# Patient Record
Sex: MALE | Race: WHITE | NOT HISPANIC OR LATINO | Employment: FULL TIME | URBAN - METROPOLITAN AREA
[De-identification: names, ages, dates, MRNs, and addresses within clinical notes are randomized per-mention and may not be internally consistent; named-entity substitution may affect disease eponyms.]

---

## 2021-06-16 PROBLEM — F43.25 ADJUSTMENT DISORDER WITH MIXED DISTURBANCE OF EMOTIONS AND CONDUCT: Status: ACTIVE | Noted: 2019-08-26

## 2021-06-16 PROBLEM — F39 UNSPECIFIED MOOD (AFFECTIVE) DISORDER (H): Status: ACTIVE | Noted: 2019-08-24

## 2021-06-16 PROBLEM — R45.851 SUICIDAL IDEATION: Status: ACTIVE | Noted: 2019-08-24

## 2024-09-30 ENCOUNTER — APPOINTMENT (OUTPATIENT)
Dept: MRI IMAGING | Facility: CLINIC | Age: 64
DRG: 617 | End: 2024-09-30
Attending: PODIATRIST

## 2024-09-30 ENCOUNTER — APPOINTMENT (OUTPATIENT)
Dept: GENERAL RADIOLOGY | Facility: CLINIC | Age: 64
DRG: 617 | End: 2024-09-30
Attending: EMERGENCY MEDICINE

## 2024-09-30 ENCOUNTER — HOSPITAL ENCOUNTER (INPATIENT)
Facility: CLINIC | Age: 64
LOS: 4 days | Discharge: HOME | DRG: 617 | End: 2024-10-04
Attending: EMERGENCY MEDICINE | Admitting: STUDENT IN AN ORGANIZED HEALTH CARE EDUCATION/TRAINING PROGRAM

## 2024-09-30 DIAGNOSIS — L03.032 CELLULITIS OF TOE OF LEFT FOOT: ICD-10-CM

## 2024-09-30 DIAGNOSIS — E11.40 TYPE 2 DIABETES MELLITUS WITH DIABETIC NEUROPATHY, WITH LONG-TERM CURRENT USE OF INSULIN (H): ICD-10-CM

## 2024-09-30 DIAGNOSIS — I10 BENIGN ESSENTIAL HYPERTENSION: ICD-10-CM

## 2024-09-30 DIAGNOSIS — Z79.4 TYPE 2 DIABETES MELLITUS WITH DIABETIC NEUROPATHY, WITH LONG-TERM CURRENT USE OF INSULIN (H): ICD-10-CM

## 2024-09-30 DIAGNOSIS — E10.65 POOR CONTROL TYPE I DIABETES MELLITUS (H): ICD-10-CM

## 2024-09-30 DIAGNOSIS — E11.65 TYPE 2 DIABETES MELLITUS WITH HYPERGLYCEMIA, WITH LONG-TERM CURRENT USE OF INSULIN (H): Primary | ICD-10-CM

## 2024-09-30 DIAGNOSIS — M86.9 OSTEOMYELITIS OF LEFT FOOT, UNSPECIFIED TYPE (H): ICD-10-CM

## 2024-09-30 DIAGNOSIS — Z79.4 TYPE 2 DIABETES MELLITUS WITH HYPERGLYCEMIA, WITH LONG-TERM CURRENT USE OF INSULIN (H): Primary | ICD-10-CM

## 2024-09-30 LAB
ANION GAP SERPL CALCULATED.3IONS-SCNC: 10 MMOL/L (ref 7–15)
BASOPHILS # BLD AUTO: 0.1 10E3/UL (ref 0–0.2)
BASOPHILS NFR BLD AUTO: 1 %
BUN SERPL-MCNC: 14.8 MG/DL (ref 8–23)
CALCIUM SERPL-MCNC: 9.1 MG/DL (ref 8.8–10.4)
CHLORIDE SERPL-SCNC: 101 MMOL/L (ref 98–107)
CREAT SERPL-MCNC: 0.5 MG/DL (ref 0.67–1.17)
CRP SERPL-MCNC: 6.78 MG/L
CRP SERPL-MCNC: 7.51 MG/L
EGFRCR SERPLBLD CKD-EPI 2021: >90 ML/MIN/1.73M2
EOSINOPHIL # BLD AUTO: 0.3 10E3/UL (ref 0–0.7)
EOSINOPHIL NFR BLD AUTO: 3 %
ERYTHROCYTE [DISTWIDTH] IN BLOOD BY AUTOMATED COUNT: 11.3 % (ref 10–15)
ERYTHROCYTE [SEDIMENTATION RATE] IN BLOOD BY WESTERGREN METHOD: 26 MM/HR (ref 0–20)
EST. AVERAGE GLUCOSE BLD GHB EST-MCNC: 338 MG/DL
GLUCOSE BLDC GLUCOMTR-MCNC: 266 MG/DL (ref 70–99)
GLUCOSE BLDC GLUCOMTR-MCNC: 300 MG/DL (ref 70–99)
GLUCOSE BLDC GLUCOMTR-MCNC: 311 MG/DL (ref 70–99)
GLUCOSE SERPL-MCNC: 300 MG/DL (ref 70–99)
HBA1C MFR BLD: 13.4 %
HCO3 SERPL-SCNC: 26 MMOL/L (ref 22–29)
HCT VFR BLD AUTO: 43 % (ref 40–53)
HGB BLD-MCNC: 14.7 G/DL (ref 13.3–17.7)
IMM GRANULOCYTES # BLD: 0 10E3/UL
IMM GRANULOCYTES NFR BLD: 0 %
LYMPHOCYTES # BLD AUTO: 2.1 10E3/UL (ref 0.8–5.3)
LYMPHOCYTES NFR BLD AUTO: 21 %
MCH RBC QN AUTO: 29.8 PG (ref 26.5–33)
MCHC RBC AUTO-ENTMCNC: 34.2 G/DL (ref 31.5–36.5)
MCV RBC AUTO: 87 FL (ref 78–100)
MONOCYTES # BLD AUTO: 0.7 10E3/UL (ref 0–1.3)
MONOCYTES NFR BLD AUTO: 7 %
MRSA DNA SPEC QL NAA+PROBE: NEGATIVE
NEUTROPHILS # BLD AUTO: 6.9 10E3/UL (ref 1.6–8.3)
NEUTROPHILS NFR BLD AUTO: 68 %
NRBC # BLD AUTO: 0 10E3/UL
NRBC BLD AUTO-RTO: 0 /100
PLATELET # BLD AUTO: 361 10E3/UL (ref 150–450)
POTASSIUM SERPL-SCNC: 3.9 MMOL/L (ref 3.4–5.3)
RBC # BLD AUTO: 4.94 10E6/UL (ref 4.4–5.9)
SA TARGET DNA: POSITIVE
SODIUM SERPL-SCNC: 137 MMOL/L (ref 135–145)
WBC # BLD AUTO: 10.1 10E3/UL (ref 4–11)

## 2024-09-30 PROCEDURE — 36415 COLL VENOUS BLD VENIPUNCTURE: CPT | Performed by: PHYSICIAN ASSISTANT

## 2024-09-30 PROCEDURE — 250N000011 HC RX IP 250 OP 636: Performed by: PHYSICIAN ASSISTANT

## 2024-09-30 PROCEDURE — 99285 EMERGENCY DEPT VISIT HI MDM: CPT | Mod: 25

## 2024-09-30 PROCEDURE — 99254 IP/OBS CNSLTJ NEW/EST MOD 60: CPT | Performed by: PODIATRIST

## 2024-09-30 PROCEDURE — 86140 C-REACTIVE PROTEIN: CPT | Performed by: PHYSICIAN ASSISTANT

## 2024-09-30 PROCEDURE — 258N000003 HC RX IP 258 OP 636: Performed by: PHYSICIAN ASSISTANT

## 2024-09-30 PROCEDURE — 83036 HEMOGLOBIN GLYCOSYLATED A1C: CPT | Performed by: EMERGENCY MEDICINE

## 2024-09-30 PROCEDURE — 85652 RBC SED RATE AUTOMATED: CPT | Performed by: PHYSICIAN ASSISTANT

## 2024-09-30 PROCEDURE — 99223 1ST HOSP IP/OBS HIGH 75: CPT | Performed by: PHYSICIAN ASSISTANT

## 2024-09-30 PROCEDURE — 36415 COLL VENOUS BLD VENIPUNCTURE: CPT | Performed by: EMERGENCY MEDICINE

## 2024-09-30 PROCEDURE — 87641 MR-STAPH DNA AMP PROBE: CPT | Performed by: PHYSICIAN ASSISTANT

## 2024-09-30 PROCEDURE — 73718 MRI LOWER EXTREMITY W/O DYE: CPT | Mod: LT

## 2024-09-30 PROCEDURE — 250N000013 HC RX MED GY IP 250 OP 250 PS 637: Performed by: PHYSICIAN ASSISTANT

## 2024-09-30 PROCEDURE — 85025 COMPLETE CBC W/AUTO DIFF WBC: CPT | Performed by: EMERGENCY MEDICINE

## 2024-09-30 PROCEDURE — 86140 C-REACTIVE PROTEIN: CPT | Performed by: EMERGENCY MEDICINE

## 2024-09-30 PROCEDURE — 120N000001 HC R&B MED SURG/OB

## 2024-09-30 PROCEDURE — 80048 BASIC METABOLIC PNL TOTAL CA: CPT | Performed by: EMERGENCY MEDICINE

## 2024-09-30 PROCEDURE — 73660 X-RAY EXAM OF TOE(S): CPT | Mod: LT

## 2024-09-30 PROCEDURE — 87040 BLOOD CULTURE FOR BACTERIA: CPT | Performed by: PHYSICIAN ASSISTANT

## 2024-09-30 PROCEDURE — 250N000012 HC RX MED GY IP 250 OP 636 PS 637: Performed by: PHYSICIAN ASSISTANT

## 2024-09-30 RX ORDER — ACETAMINOPHEN 325 MG/1
650 TABLET ORAL EVERY 4 HOURS PRN
Status: DISCONTINUED | OUTPATIENT
Start: 2024-09-30 | End: 2024-10-04 | Stop reason: HOSPADM

## 2024-09-30 RX ORDER — CALCIUM CARBONATE 500 MG/1
1000 TABLET, CHEWABLE ORAL 4 TIMES DAILY PRN
Status: DISCONTINUED | OUTPATIENT
Start: 2024-09-30 | End: 2024-10-04 | Stop reason: HOSPADM

## 2024-09-30 RX ORDER — VANCOMYCIN HYDROCHLORIDE
1500 EVERY 12 HOURS
Status: DISCONTINUED | OUTPATIENT
Start: 2024-10-01 | End: 2024-09-30

## 2024-09-30 RX ORDER — PIPERACILLIN SODIUM, TAZOBACTAM SODIUM 3; .375 G/15ML; G/15ML
3.38 INJECTION, POWDER, LYOPHILIZED, FOR SOLUTION INTRAVENOUS EVERY 6 HOURS
Status: DISCONTINUED | OUTPATIENT
Start: 2024-09-30 | End: 2024-10-04

## 2024-09-30 RX ORDER — DEXTROSE MONOHYDRATE 25 G/50ML
25-50 INJECTION, SOLUTION INTRAVENOUS
Status: DISCONTINUED | OUTPATIENT
Start: 2024-09-30 | End: 2024-10-04 | Stop reason: HOSPADM

## 2024-09-30 RX ORDER — NICOTINE POLACRILEX 4 MG
15-30 LOZENGE BUCCAL
Status: DISCONTINUED | OUTPATIENT
Start: 2024-09-30 | End: 2024-10-04 | Stop reason: HOSPADM

## 2024-09-30 RX ORDER — AMOXICILLIN 250 MG
2 CAPSULE ORAL 2 TIMES DAILY PRN
Status: DISCONTINUED | OUTPATIENT
Start: 2024-09-30 | End: 2024-10-04 | Stop reason: HOSPADM

## 2024-09-30 RX ORDER — AMOXICILLIN 250 MG
1 CAPSULE ORAL 2 TIMES DAILY PRN
Status: DISCONTINUED | OUTPATIENT
Start: 2024-09-30 | End: 2024-10-04 | Stop reason: HOSPADM

## 2024-09-30 RX ORDER — PROCHLORPERAZINE 25 MG
25 SUPPOSITORY, RECTAL RECTAL EVERY 12 HOURS PRN
Status: DISCONTINUED | OUTPATIENT
Start: 2024-09-30 | End: 2024-10-04 | Stop reason: HOSPADM

## 2024-09-30 RX ORDER — LIDOCAINE 40 MG/G
CREAM TOPICAL
Status: DISCONTINUED | OUTPATIENT
Start: 2024-09-30 | End: 2024-10-04 | Stop reason: HOSPADM

## 2024-09-30 RX ORDER — ONDANSETRON 4 MG/1
4 TABLET, ORALLY DISINTEGRATING ORAL EVERY 6 HOURS PRN
Status: DISCONTINUED | OUTPATIENT
Start: 2024-09-30 | End: 2024-10-04 | Stop reason: HOSPADM

## 2024-09-30 RX ORDER — ONDANSETRON 2 MG/ML
4 INJECTION INTRAMUSCULAR; INTRAVENOUS EVERY 6 HOURS PRN
Status: DISCONTINUED | OUTPATIENT
Start: 2024-09-30 | End: 2024-10-04 | Stop reason: HOSPADM

## 2024-09-30 RX ORDER — POLYETHYLENE GLYCOL 3350 17 G/17G
17 POWDER, FOR SOLUTION ORAL 2 TIMES DAILY PRN
Status: DISCONTINUED | OUTPATIENT
Start: 2024-09-30 | End: 2024-10-04 | Stop reason: HOSPADM

## 2024-09-30 RX ORDER — VANCOMYCIN 1.75 GRAM/500 ML IN 0.9 % SODIUM CHLORIDE INTRAVENOUS
1750 ONCE
Status: COMPLETED | OUTPATIENT
Start: 2024-09-30 | End: 2024-09-30

## 2024-09-30 RX ORDER — ACETAMINOPHEN 650 MG/1
650 SUPPOSITORY RECTAL EVERY 4 HOURS PRN
Status: DISCONTINUED | OUTPATIENT
Start: 2024-09-30 | End: 2024-10-04 | Stop reason: HOSPADM

## 2024-09-30 RX ORDER — PROCHLORPERAZINE MALEATE 10 MG
10 TABLET ORAL EVERY 6 HOURS PRN
Status: DISCONTINUED | OUTPATIENT
Start: 2024-09-30 | End: 2024-10-04 | Stop reason: HOSPADM

## 2024-09-30 RX ORDER — GABAPENTIN 100 MG/1
100 CAPSULE ORAL 3 TIMES DAILY
Status: DISCONTINUED | OUTPATIENT
Start: 2024-09-30 | End: 2024-10-04 | Stop reason: HOSPADM

## 2024-09-30 RX ORDER — CEFAZOLIN SODIUM 2 G/100ML
2 INJECTION, SOLUTION INTRAVENOUS ONCE
Status: DISCONTINUED | OUTPATIENT
Start: 2024-09-30 | End: 2024-09-30

## 2024-09-30 RX ORDER — BISACODYL 10 MG
10 SUPPOSITORY, RECTAL RECTAL DAILY PRN
Status: DISCONTINUED | OUTPATIENT
Start: 2024-09-30 | End: 2024-10-04 | Stop reason: HOSPADM

## 2024-09-30 RX ADMIN — PIPERACILLIN AND TAZOBACTAM 3.38 G: 3; .375 INJECTION, POWDER, FOR SOLUTION INTRAVENOUS at 23:49

## 2024-09-30 RX ADMIN — PIPERACILLIN AND TAZOBACTAM 3.38 G: 3; .375 INJECTION, POWDER, FOR SOLUTION INTRAVENOUS at 17:46

## 2024-09-30 RX ADMIN — VANCOMYCIN HYDROCHLORIDE 1750 MG: 10 INJECTION, POWDER, LYOPHILIZED, FOR SOLUTION INTRAVENOUS at 15:19

## 2024-09-30 RX ADMIN — GABAPENTIN 100 MG: 100 CAPSULE ORAL at 17:46

## 2024-09-30 RX ADMIN — INSULIN ASPART 3 UNITS: 100 INJECTION, SOLUTION INTRAVENOUS; SUBCUTANEOUS at 18:37

## 2024-09-30 RX ADMIN — INSULIN GLARGINE 10 UNITS: 100 INJECTION, SOLUTION SUBCUTANEOUS at 22:03

## 2024-09-30 RX ADMIN — GABAPENTIN 100 MG: 100 CAPSULE ORAL at 22:02

## 2024-09-30 RX ADMIN — PIPERACILLIN AND TAZOBACTAM 3.38 G: 3; .375 INJECTION, POWDER, FOR SOLUTION INTRAVENOUS at 12:13

## 2024-09-30 RX ADMIN — INSULIN ASPART 3 UNITS: 100 INJECTION, SOLUTION INTRAVENOUS; SUBCUTANEOUS at 22:03

## 2024-09-30 ASSESSMENT — ACTIVITIES OF DAILY LIVING (ADL)
ADLS_ACUITY_SCORE: 18
ADLS_ACUITY_SCORE: 35
ADLS_ACUITY_SCORE: 18
ADLS_ACUITY_SCORE: 35
ADLS_ACUITY_SCORE: 35
ADLS_ACUITY_SCORE: 18
DEPENDENT_IADLS:: INDEPENDENT
ADLS_ACUITY_SCORE: 35
ADLS_ACUITY_SCORE: 35
ADLS_ACUITY_SCORE: 18
ADLS_ACUITY_SCORE: 18

## 2024-09-30 ASSESSMENT — COLUMBIA-SUICIDE SEVERITY RATING SCALE - C-SSRS
2. HAVE YOU ACTUALLY HAD ANY THOUGHTS OF KILLING YOURSELF IN THE PAST MONTH?: NO
1. IN THE PAST MONTH, HAVE YOU WISHED YOU WERE DEAD OR WISHED YOU COULD GO TO SLEEP AND NOT WAKE UP?: NO
6. HAVE YOU EVER DONE ANYTHING, STARTED TO DO ANYTHING, OR PREPARED TO DO ANYTHING TO END YOUR LIFE?: NO

## 2024-09-30 NOTE — H&P
[facility]    History and Physical - Hospitalist Service       Date of Admission:  9/30/2024    Assessment & Plan   Artem Hernandez is a 64 year old male with PMHx of IDDM admitted on 9/30/2024. He presented for further evaluation of L second digit swelling and erythema, found to have osteomyelitis. Admitted for IV antibiotics and podiatry consult.     Osteomyelitis, L second toe: Clipped nail within the past week and subsequently noticed worsening erythema and swelling. No fevers.   *WBC WNL, received Zosyn in the ED   - Admit to inpatient   - Podiatry consult   - Continue PTA Zosyn, add Vancomycin  - PRN tylenol and gabapentin for pain   - MRSA swab   - Mod CHO 9/30, NPO at midnight     T2DM, insulin dependent, uncontrolled, with associated peripheral neuropathy:  Hemoglobin A1C   Date Value Ref Range Status   09/30/2024 13.4 (H) <5.7 % Final     Comment:     Normal <5.7%   Prediabetes 5.7-6.4%    Diabetes 6.5% or higher     Note: Adopted from ADA consensus guidelines.   [NPH and Novolin R BID]  - Hold PTA insulin, took AM dose   - Start Lantus 10 U at bedtime   - Medium sliding scale insulin ordered  - Novolog 1 U per 15 g CHO TID AC  - Gabapentin 100 mg TID initiated for neuropathic pain, uptitrate   - Pharmacy liaison consult for insulin coverage given lack of insurance    - BS per protocol   - Monitor for hypoglycemia    Recent Labs   Lab 09/30/24  0842   *     Hypertension: Previously on Lisinopril, but off for a while. BP elevated on admission. Monitor and add antihypertensives if persists.         Diet:  Mod CHO   DVT Prophylaxis: Pneumatic Compression Devices  Barnes Catheter: Not present  Lines: None     Cardiac Monitoring: None  Code Status:  Full Code     Clinically Significant Risk Factors Present on Admission                  # Hypertension: Home medication list includes antihypertensive(s)        # DMII: A1C = 13.4 % (Ref range: <5.7 %) within past 6 months                Disposition Plan     Medically Ready for Discharge: Anticipated in 2-4 Days         The patient's care was discussed with the Attending Physician, Dr. Morton, Bedside Nurse, Patient, and Patient's Family.    Carmen Villaseñor PA-C  Hospitalist Service  [facility]  Securely message with [employer] (more info)  Text page via [employer] Paging/Directory     ______________________________________________________________________    Chief Complaint   L toe swelling and redness     History is obtained from the patient    History of Present Illness   Artem Hernandez is a 64 year old male with PMHx of IDDM admitted on 9/30/2024. He presented for further evaluation of L second digit swelling and erythema, found to have osteomyelitis. Admitted for IV antibiotics and podiatry consult.     Seen in the ED by Dr. Pearce. WBC WBL. A1C >13. XR with concern for osteomyelitis of L 2nd toe. Received Zosyn.     During my interview, pt confirms the above hx. No culprit trauma aside from clipping his toenails the week prior with subsequent swelling and erythema. Afebrile. No hx of diabetic foot ulcer. Does not doctor due to lack of insurance, but plans to enroll this fall. Currently working. Has continued to purchase and take Novolin R and Novolin N, but is not following with a healthcare team for dose adjustment and does not routinely monitor his glucose. No pain due to baseline neuropathy.       Past Medical History    T2DM    Past Surgical History   No past surgical history on file.    Prior to Admission Medications   Prior to Admission Medications   Prescriptions Last Dose Informant Patient Reported? Taking?   NOVOLIN N NPH U-100 INSULIN 100 unit/mL injection   No No   Sig: [NOVOLIN N NPH U-100 INSULIN 100 UNIT/ML INJECTION] Give 6 units subcutaneously every morning and 4 units subcutaneously every evening.  11.65 Type 2 with hyperglycemia   NOVOLIN R REGULAR U-100 INSULN 100 unit/mL injection    No No   Sig: [NOVOLIN R REGULAR U-100 INSULN 100 UNIT/ML INJECTION] Check blood sugar three (3) times daily.  11.65 Type 2 with hyperglycemia  ReliOn Lancets (100s)  -  dispense 1  ReliOn Prime Test Strips (50's)  - dispense 1  ReliOn 8 mm 31G 0.3 mL syringe - dispense 1 case   glipiZIDE (GLUCOTROL XL) 10 MG 24 hr tablet   No No   Sig: [GLIPIZIDE (GLUCOTROL XL) 10 MG 24 HR TABLET] Take 1 tablet (10 mg total) by mouth daily with breakfast.   lisinopril (PRINIVIL,ZESTRIL) 5 MG tablet   No No   Sig: [LISINOPRIL (PRINIVIL,ZESTRIL) 5 MG TABLET] Take 1 tablet (5 mg total) by mouth daily.   metFORMIN (GLUCOPHAGE) 1000 MG tablet   No No   Sig: [METFORMIN (GLUCOPHAGE) 1000 MG TABLET] Take 1 tablet (1,000 mg total) by mouth 2 (two) times a day with meals.      Facility-Administered Medications: None        Review of Systems    The 10 point Review of Systems is negative other than noted in the HPI.    Social History   I have reviewed this patient's social history and updated it with pertinent information if needed.  Social History     Tobacco Use    Smoking status: Never    Smokeless tobacco: Never   Substance Use Topics    Alcohol use: Yes     Comment: Alcoholic Drinks/day: 2 drinks ca every 2 weeks    Drug use: Never         Family History   Reviewed and non-contributory to current chief complaint.     Allergies   No Known Allergies     Physical Exam   Vital Signs: Temp: 97.3  F (36.3  C) Temp src: Temporal BP: (!) 163/96 Pulse: 94   Resp: 20 SpO2: 93 %      Weight: 180 lbs 0 oz    CONSTITUTIONAL: Pt laying in bed, dressed in hospital garb. Appears comfortable. Cooperative with interview. Accompanied by girlfriend at bedside.   HEENT: Normocephalic, atraumatic.   CARDIOVASCULAR: RRR, no murmurs, rubs, or extra heart sounds appreciated. Pulses +2/4 and regular in upper and lower extremities, bilaterally.   RESPIRATORY: No increased work of breathing. CTA, bilat; no wheezes, rales, or rhonchi appreciated.  GASTROINTESTINAL:   Abdomen soft, non-distended. BS auscultated in all four quadrants. Negative for tenderness to palpation.    MUSCULOSKELETAL: Strength +5/5 in upper and lower extremities, bilaterally. No gross deformities noted. Normal muscle tone.   HEMATOLOGIC/LYMPHATIC/IMMUNOLOGIC: Negative for lower extremity edema, bilaterally.  NEUROLOGIC: Alert and oriented to person, place, and time. No focal neuro deficits.   SKIN: Warm, dry, intact.       Medical Decision Making       75 MINUTES SPENT BY ME on the date of service doing chart review, history, exam, documentation & further activities per the note.      Data     I have personally reviewed the following data over the past 24 hrs:    10.1  \   14.7   / 361     137 101 14.8 /  300 (H)   3.9 26 0.50 (L) \     TSH: N/A T4: N/A A1C: 13.4 (H)     Procal: N/A CRP: 7.51 (H) Lactic Acid: N/A         Imaging results reviewed over the past 24 hrs:   Recent Results (from the past 24 hour(s))   XR Toe Left G/E 2 Views    Narrative    XR TOE LEFT G/E 2 VIEWS 9/30/2024 8:55 AM     HISTORY: diabetic, infected, eval for osteo    COMPARISON: None.       Impression    IMPRESSION:   Osteomyelitis distal phalanx of the second toe where there is  cortical destructive change and soft tissue swelling.    NOTE: ABNORMAL REPORT    THE DICTATION ABOVE DESCRIBES AN ABNORMAL REPORT FOR WHICH FOLLOW-UP  IS NEEDED.    YANN SAL MD         SYSTEM ID:  [id number]

## 2024-09-30 NOTE — CONSULTS
Care Management Initial Consult    General Information  Assessment completed with: Patient,    Type of CM/SW Visit: Initial Assessment    Primary Care Provider verified and updated as needed:  (does not have one due to no insurance currently)   Readmission within the last 30 days: no previous admission in last 30 days      Reason for Consult: discharge planning, financial concerns  Advance Care Planning: Advance Care Planning Reviewed: no concerns identified          Communication Assessment  Patient's communication style: spoken language (English or Bilingual)    Hearing Difficulty or Deaf: no   Wear Glasses or Blind: no    Cognitive  Cognitive/Neuro/Behavioral: WDL                      Living Environment:   People in home: significant other  Reji  Current living Arrangements: house      Able to return to prior arrangements: yes       Family/Social Support:  Care provided by: self  Provides care for: no one  Marital Status: Lives with Significant Other  Support system: Significant Other          Description of Support System: Supportive, Involved         Current Resources:   Patient receiving home care services: No        Community Resources: None  Equipment currently used at home: glucometer  Supplies currently used at home: Diabetic Supplies    Employment/Financial:  Employment Status: employed full-time        Financial Concerns: insurance, none   Referral to Financial Worker: Yes       Does the patient's insurance plan have a 3 day qualifying hospital stay waiver?  No    Lifestyle & Psychosocial Needs:  Social Determinants of Health     Food Insecurity: Low Risk  (9/30/2024)    Food Insecurity     Within the past 12 months, did you worry that your food would run out before you got money to buy more?: No     Within the past 12 months, did the food you bought just not last and you didn t have money to get more?: No   Depression: Not on file   Housing Stability: Low Risk  (9/30/2024)    Housing Stability     Do  you have housing? : Yes     Are you worried about losing your housing?: No   Tobacco Use: Low Risk  (6/29/2021)    Patient History     Smoking Tobacco Use: Never     Smokeless Tobacco Use: Never     Passive Exposure: Not on file   Financial Resource Strain: Low Risk  (9/30/2024)    Financial Resource Strain     Within the past 12 months, have you or your family members you live with been unable to get utilities (heat, electricity) when it was really needed?: No   Alcohol Use: Not on file   Transportation Needs: Low Risk  (9/30/2024)    Transportation Needs     Within the past 12 months, has lack of transportation kept you from medical appointments, getting your medicines, non-medical meetings or appointments, work, or from getting things that you need?: No   Physical Activity: Not on file   Interpersonal Safety: Low Risk  (9/30/2024)    Interpersonal Safety     Do you feel physically and emotionally safe where you currently live?: Yes     Within the past 12 months, have you been hit, slapped, kicked or otherwise physically hurt by someone?: No     Within the past 12 months, have you been humiliated or emotionally abused in other ways by your partner or ex-partner?: No   Stress: Not on file   Social Connections: Not on file   Health Literacy: Not on file       Functional Status:  Prior to admission patient needed assistance:   Dependent ADLs:: Independent  Dependent IADLs:: Independent       Mental Health Status:          Chemical Dependency Status:                Values/Beliefs:  Spiritual, Cultural Beliefs, Taoism Practices, Values that affect care: no               Discussed  Partnership in Safe Discharge Planning  document with patient/family: No    Additional Information:  Writer met with patient and significant other Reji.  Patient states that he lives in a house with Reji and works full time as a  where he is primarily on his fee the whole shift.  Patient states that he currently does not have  health insurance but is hoping he will be able to get insurance thru his employer starting Nov. 1st.  Patient denied any other financial stressors.  He has not had a PCP due to no insurance but will get one as soon as his insurance is active.      Writer reviewed pharmacy liaison's note regarding insulin cost and patient said that the cost she explained in her note was about what he is paying.  Patient stated that he has not been checking his blood sugars.  Writer encouraged him to start checking his blood sugars on a regular basis and he agreed and knows how to get more supplies for his glucometer.  Writer briefly reviewed potential cost for home infusion explaining that it could be anywhere from $35 per day and up depending on IV antibiotic prescribed.     Next Steps: Await podiatry consult and recommendations.  If IV antibiotics needed, will send referral to  Home Infusion. [facility] if PCP is needed at this time since patient is without insurance currently.    Kim Augustin RN Care Coordinator  [facility]  [phone]

## 2024-09-30 NOTE — PLAN OF CARE
Orientation: A&Ox4  Aggression Stop Light: Green  Activity: Ind, calls appropriately   Diet/BS Checks: Mod carb with BG checks ACHS and carb count. NPO at 0000  Tele: N/A  IV Access/Drains: R PIV SL  Pain Management: Denies  Abnormal VS/Results: Elevated BG's   Bowel/Bladder: Cont b/b  Skin/Wounds: LLE 2+ edema, L second toe erythema, swollen, warm to touch   Consults: Podiatry, PT, OT, CM/SW  D/C Disposition: TBD    Other Info:   -Foot MRI to be completed, checklist completed and faxed   -Plan for surgery 10/1 (scheduled for approx 0845). NPO at 0000   -Blood cultures pending   -Arrived on unit from ED around 1430

## 2024-09-30 NOTE — CONSULTS
Patient is currently uninsured and in need of insulin therapy.       The following (pens and vials unless otherwise noted) are available for $35 per 30 days on an ongoing basis using the  copay cards noted.     Short-acting    Novolin R vial only [email or web address]   Humulin R vial only [email or web address]       Rapid-acting    Humalog [email or web address]   Novolog [email or web address]   Fiasp [email or web address]   Apidra [email or web address]       Intermediate    Humulin N [email or web address]   Novolin N vial only [email or web address]       Mixes    Humalog Mix  [email or web address]   Novolog Mix [email or web address]   Humulin Mix [email or web address]   Novolin Mix [email or web address]       Long-acting    Lantus [email or web address]   Basaglar [email or web address]   Tresiba [email or web address]   Toujeo [email or web address]     The cheapest available cash option for glucometer and testing supplies that can be provided is the Accu-Chek Guide system.  [facility] can dispense 100 Accu-Chek Guide test strips + Accu-Chek Guide Me meter + 100 Softclix or Fastclix lancets for $50.  Control solution is an additional $12.        Kim Wang  Pharmacy Technician/Liaison, Discharge Pharmacy   [phone] (voice or text)  [email or web address]

## 2024-09-30 NOTE — PROGRESS NOTES
RECEIVING UNIT ED HANDOFF REVIEW    ED Nurse Handoff Report was reviewed by: Shawanda Cano RN on September 30, 2024 at 2:34 PM

## 2024-09-30 NOTE — PHARMACY-VANCOMYCIN DOSING SERVICE
"Pharmacy Vancomycin Initial Note  Date of Service 2024  Patient's  1960  64 year old, male    Indication: Osteomyelitis of toe    Current estimated CrCl = CrCl cannot be calculated (Unknown ideal weight.).    Creatinine for last 3 days  2024:  8:42 AM Creatinine 0.50 mg/dL    Recent Vancomycin Level(s) for last 3 days  No results found for requested labs within last 3 days.      Vancomycin IV Administrations (past 72 hours)        No vancomycin orders with administrations in past 72 hours.                    Nephrotoxins and other renal medications (From now, onward)      Start     Dose/Rate Route Frequency Ordered Stop    24 1355  vancomycin (VANCOCIN) 1,750 mg in 0.9% NaCl 500 mL intermittent infusion         1,750 mg  250 mL/hr over 2 Hours Intravenous ONCE 24 1353      24 1355  vancomycin (VANCOCIN) 1,500 mg in 0.9% NaCl 250 mL intermittent infusion         1,500 mg  166.7 mL/hr over 90 Minutes Intravenous EVERY 12 HOURS 24 1353      24 1200  piperacillin-tazobactam (ZOSYN) 3.375 g vial to attach to  mL bag        Note to Pharmacy: For [facility], [facility] and [facility]: For Zosyn-naive patients, use the \"Zosyn initial dose + extended infusion\" order panel.    3.375 g  over 30 Minutes Intravenous EVERY 6 HOURS 24 1155              Contrast Orders - past 72 hours (72h ago, onward)      None            InsightRX Prediction of Planned Initial Vancomycin Regimen  Loading dose: 1750 mg ordered for 2024 - not yet given  Regimen: 1500 mg IV every 12 hours.  Start time: 02:30 on 10/01/2024  Exposure target: AUC24 (range)400-600 mg/L.hr   AUC24,ss: 554 mg/L.hr  Probability of AUC24 > 400: 80 %  Ctrough,ss: 16 mg/L  Probability of Ctrough,ss > 20: 34 %  Probability of nephrotoxicity (Lodise JACINTO ): 11 %          Plan:  Start vancomycin  1500 mg IV q12h after initial loading dose  Vancomycin monitoring method: AUC  Vancomycin therapeutic monitoring goal: 400-600 " mg*h/L  Pharmacy will check vancomycin levels as appropriate in 1-3 Days.    Serum creatinine levels will be ordered at minimum every 48 hours    Shaq Sanchez RPH

## 2024-09-30 NOTE — CONSULTS
[facility]  CONSULTATION NOTE    CHIEF COMPLAINT:      I was asked by Carmen Villaseñor PA-C  to evaluate this patient for left foot.    PATIENT HISTORY:  Artem Hernandez is a 64 year old male  with a past medical history significant for what's listed below, presented for a worsening left foot second digit infection. States he cut his nail and noticed it bleeding, but also states it may have been getting bad before that. Denies significant pain to site. His wife is at bedside. States he walks a lot on his feet for work.        Review of Systems:  A 10 point review of systems was performed and is positive for that noted above in the patient history.  All other areas are negative.     PAST MEDICAL HISTORY: Diabetes Mellitus     PAST SURGICAL HISTORY: No past surgical history on file.     MEDICATIONS:  Reviewed in Epic. Current.     ALLERGIES:  No Known Allergies     SOCIAL HISTORY:   Social History     Socioeconomic History    Marital status: Single     Spouse name: Not on file    Number of children: Not on file    Years of education: Not on file    Highest education level: Not on file   Occupational History    Not on file   Tobacco Use    Smoking status: Never    Smokeless tobacco: Never   Substance and Sexual Activity    Alcohol use: Yes     Comment: Alcoholic Drinks/day: 2 drinks ca every 2 weeks    Drug use: Never    Sexual activity: Yes     Partners: Female   Other Topics Concern    Not on file   Social History Narrative    Not on file     Social Determinants of Health     Financial Resource Strain: Low Risk  (9/30/2024)    Financial Resource Strain     Within the past 12 months, have you or your family members you live with been unable to get utilities (heat, electricity) when it was really needed?: No   Food Insecurity: Low Risk  (9/30/2024)    Food Insecurity     Within the past 12 months, did you worry that your food would run out before you got money to buy more?: No      "Within the past 12 months, did the food you bought just not last and you didn t have money to get more?: No   Transportation Needs: Low Risk  (9/30/2024)    Transportation Needs     Within the past 12 months, has lack of transportation kept you from medical appointments, getting your medicines, non-medical meetings or appointments, work, or from getting things that you need?: No   Physical Activity: Not on file   Stress: Not on file   Social Connections: Not on file   Interpersonal Safety: Low Risk  (9/30/2024)    Interpersonal Safety     Do you feel physically and emotionally safe where you currently live?: Yes     Within the past 12 months, have you been hit, slapped, kicked or otherwise physically hurt by someone?: No     Within the past 12 months, have you been humiliated or emotionally abused in other ways by your partner or ex-partner?: No   Housing Stability: Low Risk  (9/30/2024)    Housing Stability     Do you have housing? : Yes     Are you worried about losing your housing?: No        FAMILY HISTORY: No family history on file.     EXAM:Vitals: BP (!) 149/77 (BP Location: Right arm)   Pulse 98   Temp 97.8  F (36.6  C) (Oral)   Resp 18   Ht 1.702 m (5' 7\")   Wt 74.7 kg (164 lb 10.9 oz)   SpO2 97%   BMI 25.79 kg/m    BMI= Body mass index is 25.79 kg/m .    LABS:     Last Comprehensive Metabolic Panel:  Sodium   Date Value Ref Range Status   09/30/2024 137 135 - 145 mmol/L Final     Potassium   Date Value Ref Range Status   09/30/2024 3.9 3.4 - 5.3 mmol/L Final     Chloride   Date Value Ref Range Status   09/30/2024 101 98 - 107 mmol/L Final     Carbon Dioxide (CO2)   Date Value Ref Range Status   09/30/2024 26 22 - 29 mmol/L Final     Anion Gap   Date Value Ref Range Status   09/30/2024 10 7 - 15 mmol/L Final     GLUCOSE BY METER POCT   Date Value Ref Range Status   09/30/2024 266 (H) 70 - 99 mg/dL Final     Urea Nitrogen   Date Value Ref Range Status   09/30/2024 14.8 8.0 - 23.0 mg/dL Final " "    Creatinine   Date Value Ref Range Status   09/30/2024 0.50 (L) 0.67 - 1.17 mg/dL Final     GFR Estimate   Date Value Ref Range Status   09/30/2024 >90 >60 mL/min/1.73m2 Final     Comment:     eGFR calculated using 2021 CKD-EPI equation.     Calcium   Date Value Ref Range Status   09/30/2024 9.1 8.8 - 10.4 mg/dL Final     Comment:     Reference intervals for this test were updated on 7/16/2024 to reflect our healthy population more accurately. There may be differences in the flagging of prior results with similar values performed with this method. Those prior results can be interpreted in the context of the updated reference intervals.     Lab Results   Component Value Date    WBC 10.1 09/30/2024     Lab Results   Component Value Date    RBC 4.94 09/30/2024     Lab Results   Component Value Date    HGB 14.7 09/30/2024     Lab Results   Component Value Date    HCT 43.0 09/30/2024     Lab Results   Component Value Date     09/30/2024      No results found for: \"INR\"     General appearance: Patient is alert and fully cooperative with history & exam.  No sign of distress is noted during the visit.      Respiratory: Breathing is regular & unlabored while sitting.      HEENT: Hearing is intact to spoken word.  Speech is clear.  No gross evidence of visual impairment that would impact ambulation.      Dermatologic: Left foot second digit distal toe: necrotic ulcer with erythema to the base of the digit. Grossly edematous. No significant ascending infection.      Vascular: Dorsalis pedis and posterior tibial pulses are intact & regular bilaterally.  CFT and skin temperature is normal to both lower extremities.       Neurologic: Lower extremity sensation is diminished, bilateral foot, to light touch.  No evidence of neurological-based weakness or contracture in the lower extremities.       Musculoskeletal: Patient is ambulatory without an assistive device or brace.  No gross foot or ankle deformity noted.   " "    Psychiatric: Affect is pleasant & appropriate.      All cultures:  No results for input(s): \"CULTURE\" in the last 168 hours.     IMAGING:   IMPRESSION:   Osteomyelitis distal phalanx of the second toe where there is  cortical destructive change and soft tissue swelling.    ASSESSMENT:  Left foot second digit osteomyelitis   Uncontrolled Diabetes --> hba1c: 13.4     MEDICAL DECISION MAKING:   -Discussed all findings with patient. Chart and imaging reviewed.   -Left foot second digit with osteomyelitis on xray. Reviewed this with patient and his wife and need for amputation. Discussed risks and benefits and tentative post op course.  -Discussed effects of diabetes on the lower extremity at length.   -Will order MRI to confirm extent of osteomyelitis.   -NPO after midnight.   -Scheduled for approx 0845.   -Will be WB to heel only post op.     Thank you for the consultation request and the opportunity to participate in the care of Artem Maciel DPM   [place] [facility] of [facility]/Foot & Ankle Surgery  [phone]             "

## 2024-09-30 NOTE — PHARMACY-ADMISSION MEDICATION HISTORY
Pharmacist Admission Medication History    Admission medication history is complete. The information provided in this note is only as accurate as the sources available at the time of the update.    Information Source(s): Patient and CareEverywhere/SureScripts via in-person    Pertinent Information: None    Changes made to PTA medication list:  Added: None  Deleted: glipizide, lisinopril, metformin   Changed: Updated insulin doses    Allergies reviewed with patient and updates made in EHR: yes    Medication History Completed By: Karishma Suggs RPH 9/30/2024 1:03 PM    PTA Med List   Medication Sig Last Dose    NOVOLIN N NPH U-100 INSULIN 100 unit/mL injection [NOVOLIN N NPH U-100 INSULIN 100 UNIT/ML INJECTION] Give 6 units subcutaneously every morning and 4 units subcutaneously every evening.  11.65 Type 2 with hyperglycemia (Patient taking differently: Inject 4 Units subcutaneously 2 times daily (with meals).) 9/30/2024 at AM    NOVOLIN R REGULAR U-100 INSULN 100 unit/mL injection [NOVOLIN R REGULAR U-100 INSULN 100 UNIT/ML INJECTION] Check blood sugar three (3) times daily.  11.65 Type 2 with hyperglycemia  ReliOn Lancets (100s)  -  dispense 1  ReliOn Prime Test Strips (50's)  - dispense 1  ReliOn 8 mm 31G 0.3 mL syringe - dispense 1 case (Patient taking differently: Inject 6 Units subcutaneously 2 times daily (before meals).) 9/30/2024 at AM

## 2024-09-30 NOTE — ED NOTES
[facility]  ED Nurse Handoff Report    ED Chief complaint: Toe Pain      ED Diagnosis:   Final diagnoses:   None       Code Status: Not addressed in ED    Allergies: No Known Allergies    Patient Story: Left toe swelling, 2nd toe, started about three days ago, hx of diabetes.   Focused Assessment:    Labs Ordered and Resulted from Time of ED Arrival to Time of ED Departure   BASIC METABOLIC PANEL - Abnormal       Result Value    Sodium 137      Potassium 3.9      Chloride 101      Carbon Dioxide (CO2) 26      Anion Gap 10      Urea Nitrogen 14.8      Creatinine 0.50 (*)     GFR Estimate >90      Calcium 9.1      Glucose 300 (*)    CRP INFLAMMATION - Abnormal    CRP Inflammation 7.51 (*)    HEMOGLOBIN A1C - Abnormal    Estimated Average Glucose 338 (*)     Hemoglobin A1C 13.4 (*)    CBC WITH PLATELETS AND DIFFERENTIAL    WBC Count 10.1      RBC Count 4.94      Hemoglobin 14.7      Hematocrit 43.0      MCV 87      MCH 29.8      MCHC 34.2      RDW 11.3      Platelet Count 361      % Neutrophils 68      % Lymphocytes 21      % Monocytes 7      % Eosinophils 3      % Basophils 1      % Immature Granulocytes 0      NRBCs per 100 WBC 0      Absolute Neutrophils 6.9      Absolute Lymphocytes 2.1      Absolute Monocytes 0.7      Absolute Eosinophils 0.3      Absolute Basophils 0.1      Absolute Immature Granulocytes 0.0      Absolute NRBCs 0.0        XR Toe Left G/E 2 Views   Final Result   IMPRESSION:    Osteomyelitis distal phalanx of the second toe where there is   cortical destructive change and soft tissue swelling.      NOTE: ABNORMAL REPORT      THE DICTATION ABOVE DESCRIBES AN ABNORMAL REPORT FOR WHICH FOLLOW-UP   IS NEEDED.      YANN SAL MD            SYSTEM ID:  [id number]            Treatments and/or interventions provided: see MAR, none yet  Patient's response to treatments and/or interventions: no changes    To be done/followed up on inpatient unit:  see orders    Does this patient have  any cognitive concerns?:  none    Activity level - Baseline/Home:  Independent  Activity Level - Current:   Independent    Patient's Preferred language: English   Needed?: No    Isolation: None  Infection: Not Applicable  Patient tested for COVID 19 prior to admission: NO  Bariatric?: No    Vital Signs:   Vitals:    09/30/24 0819 09/30/24 0821 09/30/24 0941   BP: (!) 197/114  (!) 163/96   Pulse: 116  94   Resp:  20    Temp:  97.3  F (36.3  C)    TempSrc:  Temporal    SpO2: 98%  93%   Weight:  81.6 kg (180 lb)        Cardiac Rhythm:     Was the PSS-3 completed:   Yes  What interventions are required if any?               Family Comments: sig other at bedside  OBS brochure/video discussed/provided to patient/family: No              Name of person given brochure if not patient:               Relationship to patient:     For the majority of the shift this patient's behavior was Green.   Behavioral interventions performed were none.    ED NURSE PHONE NUMBER: [id number]

## 2024-09-30 NOTE — ED TRIAGE NOTES
Left toe swelling, 2nd toe, started about three days ago, hx of diabetes.      Triage Assessment (Adult)       Row Name 09/30/24 0822          Triage Assessment    Airway WDL WDL        Respiratory WDL    Respiratory WDL WDL        Skin Circulation/Temperature WDL    Skin Circulation/Temperature WDL X  Left foot swelling, 2nd toe open wound with some redness        Cardiac WDL    Cardiac WDL WDL        Peripheral/Neurovascular WDL    Peripheral Neurovascular WDL WDL        Cognitive/Neuro/Behavioral WDL    Cognitive/Neuro/Behavioral WDL WDL

## 2024-09-30 NOTE — ED PROVIDER NOTES
"  Emergency Department Note      History of Present Illness     Chief Complaint   Toe Pain    HPI   Artem Hernandez is a 64 year old male with history of insulin-dependent diabetes mellitus who presents with his partner for left second toe pain and swelling. Patient reports that he clipped his toenails a couple of weeks ago and afterwards he noticed that his left second toe \"looked funny\" afterwards. He noticed that his toe started to swell and become painful three days ago. He has not tried soaking his foot. Patient does not have sensation in his foot at baseline due to neuropathy. He has not had a PCP or endocrinologist for over four years and manages his diabetes on his own. Patient says that [facility] refills his Insulin without a physician's orders. He does not currently have insurance and missed open enrollment but says that he will get this through his new job in December.     Independent Historian   None    Review of External Notes   Viewed the discharge summary from 8/27/2019 where he was seen for psychiatric reasons and at that time was noted to have out-of-control diabetes due to noncompliance and not having insurance.    Past Medical History     Medical History and Problem List   Suicidal ideation   Unspecified mood disorder   DDD  Diabetes mellitus     Medications   Insulin     Physical Exam     Patient Vitals for the past 24 hrs:   BP Temp Temp src Pulse Resp SpO2 Weight   09/30/24 0941 (!) 163/96 -- -- 94 -- 93 % --   09/30/24 0821 -- 97.3  F (36.3  C) Temporal -- 20 -- 81.6 kg (180 lb)   09/30/24 0819 (!) 197/114 -- -- 116 -- 98 % --     Physical Exam  Nursing note and vitals reviewed.    Constitutional:  Appears comfortable.   HENT:    Mucous membranes are moist.  Cardiovascular:  Normal rate, regular rhythm.     DP pulse 2+.  Cap refill less than 2 seconds.  Pulmonary:  Lungs are clear.  GI:   No abdominal tenderness.  Musculoskeletal:  Infection with redness and swelling of the left second toe " extending to the base of the toe on the top of the foot.  There is minimal tenderness as he has decreased sensation.  There is a lot of maceration of the skin of the tip of the toe as well and obvious wound.   Neurological:   Alert and oriented.  Sensation markedly diminished in his feet bilaterally.    Skin:    Skin is warm and dry.  Redness to the left second toe and foot as noted above and maceration of the skin and wound to the tip of the toe  Psychiatric:   Behavior is normal. Appropriate mood and affect.     Judgment and thought content normal.      Diagnostics     Lab Results   Labs Ordered and Resulted from Time of ED Arrival to Time of ED Departure   BASIC METABOLIC PANEL - Abnormal       Result Value    Sodium 137      Potassium 3.9      Chloride 101      Carbon Dioxide (CO2) 26      Anion Gap 10      Urea Nitrogen 14.8      Creatinine 0.50 (*)     GFR Estimate >90      Calcium 9.1      Glucose 300 (*)    CRP INFLAMMATION - Abnormal    CRP Inflammation 7.51 (*)    HEMOGLOBIN A1C - Abnormal    Estimated Average Glucose 338 (*)     Hemoglobin A1C 13.4 (*)    CBC WITH PLATELETS AND DIFFERENTIAL    WBC Count 10.1      RBC Count 4.94      Hemoglobin 14.7      Hematocrit 43.0      MCV 87      MCH 29.8      MCHC 34.2      RDW 11.3      Platelet Count 361      % Neutrophils 68      % Lymphocytes 21      % Monocytes 7      % Eosinophils 3      % Basophils 1      % Immature Granulocytes 0      NRBCs per 100 WBC 0      Absolute Neutrophils 6.9      Absolute Lymphocytes 2.1      Absolute Monocytes 0.7      Absolute Eosinophils 0.3      Absolute Basophils 0.1      Absolute Immature Granulocytes 0.0      Absolute NRBCs 0.0     GLUCOSE MONITOR NURSING POCT   GLUCOSE MONITOR NURSING POCT   MRSA MSSA PCR, NASAL SWAB     Imaging   XR Toe Left G/E 2 Views   Final Result   IMPRESSION:    Osteomyelitis distal phalanx of the second toe where there is   cortical destructive change and soft tissue swelling.      NOTE: ABNORMAL  REPORT      THE DICTATION ABOVE DESCRIBES AN ABNORMAL REPORT FOR WHICH FOLLOW-UP   IS NEEDED.      YANN SAL MD            SYSTEM ID:  [id number]        EKG   None     Independent Interpretation   None    ED Course      Medications Administered   Medications   piperacillin-tazobactam (ZOSYN) 3.375 g vial to attach to  mL bag (has no administration in time range)   glucose gel 15-30 g (has no administration in time range)     Or   dextrose 50 % injection 25-50 mL (has no administration in time range)     Or   glucagon injection 1 mg (has no administration in time range)   insulin aspart (NovoLOG) injection (RAPID ACTING) (has no administration in time range)       Procedures   None      Discussion of Management   Admitting Hospitalist, Carmen for Dr. Morton    ED Course   ED Course as of 09/30/24 1202   Mon Sep 30, 2024   0829 I evaluated the patient, obtained history, and performed a physical exam as detailed above.      Additional Documentation  None    Medical Decision Making / Diagnosis       MIPS   None    MDM   Artem Hernandez is a 64 year old male who comes in with poor diabetic care and not seeing a doctor for about 4 years insulin-dependent with toe wound and redness of his toe.  X-ray confirms osteomyelitis and cellulitis.  His hemoglobin A1c is 13.4, glucose 300, renal function looks good electrolytes and CBC are normal.  This patient needs to come in for IV antibiotics and Unasyn is started.  Will get orthopedic consultation with probable need for amputation of his toe.  He will need to get back on proper doses of medications to get his glycemic control better especially with the infection.  Will get  involved because of his insurance issues.  I talked with Carmen from the hospitalist service and Dr. Morton will be the admitting hospitalist.  Patient is in agreement with this.    Disposition   The patient was admitted to the hospital.     Diagnosis     ICD-10-CM    1.  Osteomyelitis of left foot, unspecified type (H)  M86.9     Second toe      2. Cellulitis of toe of left foot  L03.032       3. Poor control type I diabetes mellitus (H)  E10.65            Discharge Medications   New Prescriptions    No medications on file     Scribe Disclosure:  Denise HUDDLESTON, am serving as a scribe at 8:34 AM on 9/30/2024 to document services personally performed by Nora Pearce MD based on my observations and the provider's statements to me.        Nora Pearce MD  09/30/24 [id number]

## 2024-10-01 ENCOUNTER — ANESTHESIA EVENT (OUTPATIENT)
Dept: SURGERY | Facility: CLINIC | Age: 64
DRG: 617 | End: 2024-10-01

## 2024-10-01 ENCOUNTER — ANESTHESIA (OUTPATIENT)
Dept: SURGERY | Facility: CLINIC | Age: 64
DRG: 617 | End: 2024-10-01

## 2024-10-01 LAB
ANION GAP SERPL CALCULATED.3IONS-SCNC: 11 MMOL/L (ref 7–15)
BACTERIA SPEC CULT: NORMAL
BASOPHILS # BLD AUTO: 0.1 10E3/UL (ref 0–0.2)
BASOPHILS NFR BLD AUTO: 1 %
BUN SERPL-MCNC: 17.4 MG/DL (ref 8–23)
CALCIUM SERPL-MCNC: 8.7 MG/DL (ref 8.8–10.4)
CHLORIDE SERPL-SCNC: 106 MMOL/L (ref 98–107)
CREAT SERPL-MCNC: 0.59 MG/DL (ref 0.67–1.17)
EGFRCR SERPLBLD CKD-EPI 2021: >90 ML/MIN/1.73M2
EOSINOPHIL # BLD AUTO: 0.3 10E3/UL (ref 0–0.7)
EOSINOPHIL NFR BLD AUTO: 4 %
ERYTHROCYTE [DISTWIDTH] IN BLOOD BY AUTOMATED COUNT: 11.4 % (ref 10–15)
GLUCOSE BLDC GLUCOMTR-MCNC: 123 MG/DL (ref 70–99)
GLUCOSE BLDC GLUCOMTR-MCNC: 215 MG/DL (ref 70–99)
GLUCOSE BLDC GLUCOMTR-MCNC: 229 MG/DL (ref 70–99)
GLUCOSE BLDC GLUCOMTR-MCNC: 243 MG/DL (ref 70–99)
GLUCOSE BLDC GLUCOMTR-MCNC: 329 MG/DL (ref 70–99)
GLUCOSE BLDC GLUCOMTR-MCNC: 331 MG/DL (ref 70–99)
GLUCOSE BLDC GLUCOMTR-MCNC: 371 MG/DL (ref 70–99)
GLUCOSE SERPL-MCNC: 348 MG/DL (ref 70–99)
GRAM STAIN RESULT: NORMAL
GRAM STAIN RESULT: NORMAL
HCO3 SERPL-SCNC: 25 MMOL/L (ref 22–29)
HCT VFR BLD AUTO: 38.6 % (ref 40–53)
HGB BLD-MCNC: 12.9 G/DL (ref 13.3–17.7)
IMM GRANULOCYTES # BLD: 0 10E3/UL
IMM GRANULOCYTES NFR BLD: 1 %
LYMPHOCYTES # BLD AUTO: 1.9 10E3/UL (ref 0.8–5.3)
LYMPHOCYTES NFR BLD AUTO: 24 %
MCH RBC QN AUTO: 29.1 PG (ref 26.5–33)
MCHC RBC AUTO-ENTMCNC: 33.4 G/DL (ref 31.5–36.5)
MCV RBC AUTO: 87 FL (ref 78–100)
MONOCYTES # BLD AUTO: 0.6 10E3/UL (ref 0–1.3)
MONOCYTES NFR BLD AUTO: 7 %
NEUTROPHILS # BLD AUTO: 5.2 10E3/UL (ref 1.6–8.3)
NEUTROPHILS NFR BLD AUTO: 65 %
NRBC # BLD AUTO: 0 10E3/UL
NRBC BLD AUTO-RTO: 0 /100
PLATELET # BLD AUTO: 330 10E3/UL (ref 150–450)
POTASSIUM SERPL-SCNC: 3.7 MMOL/L (ref 3.4–5.3)
RBC # BLD AUTO: 4.44 10E6/UL (ref 4.4–5.9)
SODIUM SERPL-SCNC: 142 MMOL/L (ref 135–145)
WBC # BLD AUTO: 8.1 10E3/UL (ref 4–11)

## 2024-10-01 PROCEDURE — 250N000013 HC RX MED GY IP 250 OP 250 PS 637: Performed by: PHYSICIAN ASSISTANT

## 2024-10-01 PROCEDURE — 360N000082 HC SURGERY LEVEL 2 W/ FLUORO, PER MIN: Performed by: PODIATRIST

## 2024-10-01 PROCEDURE — 250N000011 HC RX IP 250 OP 636: Performed by: STUDENT IN AN ORGANIZED HEALTH CARE EDUCATION/TRAINING PROGRAM

## 2024-10-01 PROCEDURE — 258N000003 HC RX IP 258 OP 636: Performed by: PHYSICIAN ASSISTANT

## 2024-10-01 PROCEDURE — 250N000013 HC RX MED GY IP 250 OP 250 PS 637: Performed by: PODIATRIST

## 2024-10-01 PROCEDURE — 88311 DECALCIFY TISSUE: CPT | Mod: 26 | Performed by: PATHOLOGY

## 2024-10-01 PROCEDURE — 88305 TISSUE EXAM BY PATHOLOGIST: CPT | Mod: 26 | Performed by: PATHOLOGY

## 2024-10-01 PROCEDURE — 28825 PARTIAL AMPUTATION OF TOE: CPT | Mod: T1 | Performed by: PODIATRIST

## 2024-10-01 PROCEDURE — 120N000001 HC R&B MED SURG/OB

## 2024-10-01 PROCEDURE — 710N000009 HC RECOVERY PHASE 1, LEVEL 1, PER MIN: Performed by: PODIATRIST

## 2024-10-01 PROCEDURE — 87075 CULTR BACTERIA EXCEPT BLOOD: CPT | Performed by: PODIATRIST

## 2024-10-01 PROCEDURE — L3260 AMBULATORY SURGICAL BOOT EAC: HCPCS

## 2024-10-01 PROCEDURE — 250N000011 HC RX IP 250 OP 636: Performed by: PODIATRIST

## 2024-10-01 PROCEDURE — 370N000017 HC ANESTHESIA TECHNICAL FEE, PER MIN: Performed by: PODIATRIST

## 2024-10-01 PROCEDURE — 85014 HEMATOCRIT: CPT | Performed by: HOSPITALIST

## 2024-10-01 PROCEDURE — 87077 CULTURE AEROBIC IDENTIFY: CPT | Performed by: PODIATRIST

## 2024-10-01 PROCEDURE — 99232 SBSQ HOSP IP/OBS MODERATE 35: CPT | Performed by: HOSPITALIST

## 2024-10-01 PROCEDURE — 250N000011 HC RX IP 250 OP 636: Performed by: NURSE ANESTHETIST, CERTIFIED REGISTERED

## 2024-10-01 PROCEDURE — 80048 BASIC METABOLIC PNL TOTAL CA: CPT | Performed by: HOSPITALIST

## 2024-10-01 PROCEDURE — 88305 TISSUE EXAM BY PATHOLOGIST: CPT | Mod: TC | Performed by: PODIATRIST

## 2024-10-01 PROCEDURE — 250N000009 HC RX 250: Performed by: PODIATRIST

## 2024-10-01 PROCEDURE — 28820 AMPUTATION OF TOE: CPT | Performed by: ANESTHESIOLOGY

## 2024-10-01 PROCEDURE — 258N000003 HC RX IP 258 OP 636: Performed by: STUDENT IN AN ORGANIZED HEALTH CARE EDUCATION/TRAINING PROGRAM

## 2024-10-01 PROCEDURE — 250N000011 HC RX IP 250 OP 636: Performed by: PHYSICIAN ASSISTANT

## 2024-10-01 PROCEDURE — 258N000003 HC RX IP 258 OP 636: Performed by: NURSE ANESTHETIST, CERTIFIED REGISTERED

## 2024-10-01 PROCEDURE — 36415 COLL VENOUS BLD VENIPUNCTURE: CPT | Performed by: HOSPITALIST

## 2024-10-01 PROCEDURE — 28820 AMPUTATION OF TOE: CPT | Performed by: NURSE ANESTHETIST, CERTIFIED REGISTERED

## 2024-10-01 PROCEDURE — 87205 SMEAR GRAM STAIN: CPT | Performed by: PODIATRIST

## 2024-10-01 PROCEDURE — 85004 AUTOMATED DIFF WBC COUNT: CPT | Performed by: HOSPITALIST

## 2024-10-01 PROCEDURE — 250N000009 HC RX 250: Performed by: NURSE ANESTHETIST, CERTIFIED REGISTERED

## 2024-10-01 PROCEDURE — 999N000141 HC STATISTIC PRE-PROCEDURE NURSING ASSESSMENT: Performed by: PODIATRIST

## 2024-10-01 PROCEDURE — 272N000001 HC OR GENERAL SUPPLY STERILE: Performed by: PODIATRIST

## 2024-10-01 PROCEDURE — 0Y6S0Z3 DETACHMENT AT LEFT 2ND TOE, LOW, OPEN APPROACH: ICD-10-PCS | Performed by: PODIATRIST

## 2024-10-01 RX ORDER — BUPIVACAINE HYDROCHLORIDE 5 MG/ML
INJECTION, SOLUTION EPIDURAL; INTRACAUDAL PRN
Status: DISCONTINUED | OUTPATIENT
Start: 2024-10-01 | End: 2024-10-01 | Stop reason: HOSPADM

## 2024-10-01 RX ORDER — MAGNESIUM HYDROXIDE 1200 MG/15ML
LIQUID ORAL PRN
Status: DISCONTINUED | OUTPATIENT
Start: 2024-10-01 | End: 2024-10-01 | Stop reason: HOSPADM

## 2024-10-01 RX ORDER — PROPOFOL 10 MG/ML
INJECTION, EMULSION INTRAVENOUS PRN
Status: DISCONTINUED | OUTPATIENT
Start: 2024-10-01 | End: 2024-10-01

## 2024-10-01 RX ORDER — SODIUM CHLORIDE, SODIUM LACTATE, POTASSIUM CHLORIDE, CALCIUM CHLORIDE 600; 310; 30; 20 MG/100ML; MG/100ML; MG/100ML; MG/100ML
INJECTION, SOLUTION INTRAVENOUS CONTINUOUS
Status: CANCELLED | OUTPATIENT
Start: 2024-10-01

## 2024-10-01 RX ORDER — PROPOFOL 10 MG/ML
INJECTION, EMULSION INTRAVENOUS CONTINUOUS PRN
Status: DISCONTINUED | OUTPATIENT
Start: 2024-10-01 | End: 2024-10-01

## 2024-10-01 RX ORDER — ONDANSETRON 4 MG/1
4 TABLET, ORALLY DISINTEGRATING ORAL EVERY 30 MIN PRN
Status: DISCONTINUED | OUTPATIENT
Start: 2024-10-01 | End: 2024-10-01 | Stop reason: HOSPADM

## 2024-10-01 RX ORDER — ONDANSETRON 2 MG/ML
INJECTION INTRAMUSCULAR; INTRAVENOUS PRN
Status: DISCONTINUED | OUTPATIENT
Start: 2024-10-01 | End: 2024-10-01

## 2024-10-01 RX ORDER — SODIUM CHLORIDE, SODIUM LACTATE, POTASSIUM CHLORIDE, CALCIUM CHLORIDE 600; 310; 30; 20 MG/100ML; MG/100ML; MG/100ML; MG/100ML
INJECTION, SOLUTION INTRAVENOUS CONTINUOUS
Status: DISCONTINUED | OUTPATIENT
Start: 2024-10-01 | End: 2024-10-01 | Stop reason: HOSPADM

## 2024-10-01 RX ORDER — ONDANSETRON 2 MG/ML
4 INJECTION INTRAMUSCULAR; INTRAVENOUS EVERY 30 MIN PRN
Status: DISCONTINUED | OUTPATIENT
Start: 2024-10-01 | End: 2024-10-01 | Stop reason: HOSPADM

## 2024-10-01 RX ORDER — ACETAMINOPHEN 325 MG/1
975 TABLET ORAL EVERY 8 HOURS
Status: COMPLETED | OUTPATIENT
Start: 2024-10-01 | End: 2024-10-04

## 2024-10-01 RX ORDER — POLYETHYLENE GLYCOL 3350 17 G/17G
17 POWDER, FOR SOLUTION ORAL DAILY
Status: DISCONTINUED | OUTPATIENT
Start: 2024-10-02 | End: 2024-10-04 | Stop reason: HOSPADM

## 2024-10-01 RX ORDER — ACETAMINOPHEN 325 MG/1
650 TABLET ORAL EVERY 4 HOURS PRN
Status: DISCONTINUED | OUTPATIENT
Start: 2024-10-04 | End: 2024-10-01

## 2024-10-01 RX ORDER — BUPIVACAINE HYDROCHLORIDE 5 MG/ML
INJECTION, SOLUTION EPIDURAL; INTRACAUDAL
Status: DISCONTINUED
Start: 2024-10-01 | End: 2024-10-01 | Stop reason: HOSPADM

## 2024-10-01 RX ORDER — HYDROMORPHONE HCL IN WATER/PF 6 MG/30 ML
0.2 PATIENT CONTROLLED ANALGESIA SYRINGE INTRAVENOUS EVERY 5 MIN PRN
Status: DISCONTINUED | OUTPATIENT
Start: 2024-10-01 | End: 2024-10-01 | Stop reason: HOSPADM

## 2024-10-01 RX ORDER — DEXAMETHASONE SODIUM PHOSPHATE 4 MG/ML
4 INJECTION, SOLUTION INTRA-ARTICULAR; INTRALESIONAL; INTRAMUSCULAR; INTRAVENOUS; SOFT TISSUE
Status: DISCONTINUED | OUTPATIENT
Start: 2024-10-01 | End: 2024-10-01 | Stop reason: HOSPADM

## 2024-10-01 RX ORDER — SODIUM CHLORIDE, SODIUM LACTATE, POTASSIUM CHLORIDE, CALCIUM CHLORIDE 600; 310; 30; 20 MG/100ML; MG/100ML; MG/100ML; MG/100ML
INJECTION, SOLUTION INTRAVENOUS CONTINUOUS PRN
Status: DISCONTINUED | OUTPATIENT
Start: 2024-10-01 | End: 2024-10-01

## 2024-10-01 RX ORDER — BISACODYL 10 MG
10 SUPPOSITORY, RECTAL RECTAL DAILY PRN
Status: DISCONTINUED | OUTPATIENT
Start: 2024-10-04 | End: 2024-10-01

## 2024-10-01 RX ORDER — FENTANYL CITRATE 50 UG/ML
25 INJECTION, SOLUTION INTRAMUSCULAR; INTRAVENOUS EVERY 5 MIN PRN
Status: DISCONTINUED | OUTPATIENT
Start: 2024-10-01 | End: 2024-10-01 | Stop reason: HOSPADM

## 2024-10-01 RX ORDER — AMOXICILLIN 250 MG
1 CAPSULE ORAL 2 TIMES DAILY
Status: DISCONTINUED | OUTPATIENT
Start: 2024-10-01 | End: 2024-10-04 | Stop reason: HOSPADM

## 2024-10-01 RX ORDER — HYDROMORPHONE HCL IN WATER/PF 6 MG/30 ML
0.4 PATIENT CONTROLLED ANALGESIA SYRINGE INTRAVENOUS EVERY 5 MIN PRN
Status: DISCONTINUED | OUTPATIENT
Start: 2024-10-01 | End: 2024-10-01 | Stop reason: HOSPADM

## 2024-10-01 RX ORDER — LIDOCAINE HYDROCHLORIDE 20 MG/ML
INJECTION, SOLUTION INFILTRATION; PERINEURAL PRN
Status: DISCONTINUED | OUTPATIENT
Start: 2024-10-01 | End: 2024-10-01

## 2024-10-01 RX ORDER — LIDOCAINE 40 MG/G
CREAM TOPICAL
Status: DISCONTINUED | OUTPATIENT
Start: 2024-10-01 | End: 2024-10-01

## 2024-10-01 RX ORDER — FENTANYL CITRATE 50 UG/ML
50 INJECTION, SOLUTION INTRAMUSCULAR; INTRAVENOUS EVERY 5 MIN PRN
Status: DISCONTINUED | OUTPATIENT
Start: 2024-10-01 | End: 2024-10-01 | Stop reason: HOSPADM

## 2024-10-01 RX ORDER — ONDANSETRON 2 MG/ML
4 INJECTION INTRAMUSCULAR; INTRAVENOUS EVERY 6 HOURS PRN
Status: DISCONTINUED | OUTPATIENT
Start: 2024-10-01 | End: 2024-10-01

## 2024-10-01 RX ORDER — NALOXONE HYDROCHLORIDE 0.4 MG/ML
0.1 INJECTION, SOLUTION INTRAMUSCULAR; INTRAVENOUS; SUBCUTANEOUS
Status: DISCONTINUED | OUTPATIENT
Start: 2024-10-01 | End: 2024-10-01 | Stop reason: HOSPADM

## 2024-10-01 RX ORDER — PROCHLORPERAZINE MALEATE 10 MG
10 TABLET ORAL EVERY 6 HOURS PRN
Status: DISCONTINUED | OUTPATIENT
Start: 2024-10-01 | End: 2024-10-01

## 2024-10-01 RX ORDER — ONDANSETRON 4 MG/1
4 TABLET, ORALLY DISINTEGRATING ORAL EVERY 6 HOURS PRN
Status: DISCONTINUED | OUTPATIENT
Start: 2024-10-01 | End: 2024-10-01

## 2024-10-01 RX ADMIN — PIPERACILLIN AND TAZOBACTAM 3.38 G: 3; .375 INJECTION, POWDER, FOR SOLUTION INTRAVENOUS at 11:44

## 2024-10-01 RX ADMIN — INSULIN ASPART 4 UNITS: 100 INJECTION, SOLUTION INTRAVENOUS; SUBCUTANEOUS at 13:33

## 2024-10-01 RX ADMIN — SODIUM CHLORIDE, POTASSIUM CHLORIDE, SODIUM LACTATE AND CALCIUM CHLORIDE: 600; 310; 30; 20 INJECTION, SOLUTION INTRAVENOUS at 08:33

## 2024-10-01 RX ADMIN — GABAPENTIN 100 MG: 100 CAPSULE ORAL at 16:43

## 2024-10-01 RX ADMIN — ONDANSETRON 4 MG: 2 INJECTION INTRAMUSCULAR; INTRAVENOUS at 08:52

## 2024-10-01 RX ADMIN — VANCOMYCIN HYDROCHLORIDE 1250 MG: 10 INJECTION, POWDER, LYOPHILIZED, FOR SOLUTION INTRAVENOUS at 13:31

## 2024-10-01 RX ADMIN — ACETAMINOPHEN 975 MG: 325 TABLET ORAL at 22:17

## 2024-10-01 RX ADMIN — MIDAZOLAM 2 MG: 1 INJECTION INTRAMUSCULAR; INTRAVENOUS at 08:42

## 2024-10-01 RX ADMIN — GABAPENTIN 100 MG: 100 CAPSULE ORAL at 22:17

## 2024-10-01 RX ADMIN — SENNOSIDES AND DOCUSATE SODIUM 1 TABLET: 50; 8.6 TABLET ORAL at 22:16

## 2024-10-01 RX ADMIN — PROPOFOL 100 MCG/KG/MIN: 10 INJECTION, EMULSION INTRAVENOUS at 08:47

## 2024-10-01 RX ADMIN — INSULIN GLARGINE 10 UNITS: 100 INJECTION, SOLUTION SUBCUTANEOUS at 22:18

## 2024-10-01 RX ADMIN — INSULIN ASPART 3 UNITS: 100 INJECTION, SOLUTION INTRAVENOUS; SUBCUTANEOUS at 18:27

## 2024-10-01 RX ADMIN — PIPERACILLIN AND TAZOBACTAM 3.38 G: 3; .375 INJECTION, POWDER, FOR SOLUTION INTRAVENOUS at 05:50

## 2024-10-01 RX ADMIN — VANCOMYCIN HYDROCHLORIDE 1500 MG: 10 INJECTION, POWDER, LYOPHILIZED, FOR SOLUTION INTRAVENOUS at 02:33

## 2024-10-01 RX ADMIN — ACETAMINOPHEN 975 MG: 325 TABLET ORAL at 13:32

## 2024-10-01 RX ADMIN — PROPOFOL 20 MG: 10 INJECTION, EMULSION INTRAVENOUS at 08:46

## 2024-10-01 RX ADMIN — PIPERACILLIN AND TAZOBACTAM 3.38 G: 3; .375 INJECTION, POWDER, FOR SOLUTION INTRAVENOUS at 18:26

## 2024-10-01 RX ADMIN — LIDOCAINE HYDROCHLORIDE 60 MG: 20 INJECTION, SOLUTION INFILTRATION; PERINEURAL at 08:45

## 2024-10-01 ASSESSMENT — ACTIVITIES OF DAILY LIVING (ADL)
ADLS_ACUITY_SCORE: 19
ADLS_ACUITY_SCORE: 19
ADLS_ACUITY_SCORE: 18
ADLS_ACUITY_SCORE: 18
ADLS_ACUITY_SCORE: 19
ADLS_ACUITY_SCORE: 18
ADLS_ACUITY_SCORE: 19
ADLS_ACUITY_SCORE: 18
ADLS_ACUITY_SCORE: 19
ADLS_ACUITY_SCORE: 18
ADLS_ACUITY_SCORE: 19
ADLS_ACUITY_SCORE: 18
ADLS_ACUITY_SCORE: 19
ADLS_ACUITY_SCORE: 18
ADLS_ACUITY_SCORE: 19
ADLS_ACUITY_SCORE: 18

## 2024-10-01 ASSESSMENT — LIFESTYLE VARIABLES: TOBACCO_USE: 0

## 2024-10-01 NOTE — PROGRESS NOTES
"[facility]    Medicine Progress Note - Hospitalist Service    Date of Admission:  9/30/2024    Assessment & Plan   Artem Hrenandez is a 64 year old male admitted on 9/30/2024.  PMHx of IDDM admitted on 9/30/2024. He presented for further evaluation of L second digit swelling and erythema, found to have osteomyelitis. Admitted for IV antibiotics and podiatry consult.      Osteomyelitis, L second toe s/p partial amputation  *Clipped nail within the past week and subsequently noticed worsening erythema and swelling. Afebrile without leukocytosis on arrival.   *MRI left foot showing abnormal marrow signal diffusely throughout distal 2nd distal phalanx and more subtle signal abnormality of middle phalanx  *underwent uncomplicated partial amputation as above    - Podiatry recs appreciated  - Continue PTA Zosyn  - MRSA swab negative, discontinue vanco  - gram stain negative for organisms, culture in process  - continue zosyn  - PRN tylenol and gabapentin for pain      T2DM, insulin dependent, uncontrolled, with associated peripheral neuropathy  [NPH and Novolin R BID]  *A1C 13.4 9/30  - continue Lantus 10 U at bedtime   - increase to high dose ssi  - Novolog 1 U per 15 g CHO TID AC  - Gabapentin 100 mg TID initiated for neuropathic pain, uptitrate   - Pharmacy liaison consult for insulin coverage given lack of insurance       Hypertension  *Previously on Lisinopril, but off for a while.   - mildly hypertensive post-op; monitor at this time            Diet: Moderate Consistent Carb (60 g CHO per Meal) Diet    DVT Prophylaxis: Pneumatic Compression Devices  Barnes Catheter: Not present  Lines: None     Cardiac Monitoring: None  Code Status: Full Code      Clinically Significant Risk Factors                            # Overweight: Estimated body mass index is 26.1 kg/m  as calculated from the following:    Height as of this encounter: 1.702 m (5' 7\").    Weight as of this encounter: 75.6 kg (166 lb " 10.7 oz)., PRESENT ON ADMISSION            Disposition Plan     Medically Ready for Discharge: Anticipated in 2-4 Days             Regino Trejo MD  Hospitalist Service  [facility]  Securely message with [employer] (more info)  Text page via [employer] Paging/Directory   ______________________________________________________________________    Interval History   He is feeling well, denies any pain, lightheadedness, nausea, lethargy post-op.  No fever/chills or other complaints.     Physical Exam   Vital Signs: Temp: 98.6  F (37  C) Temp src: Oral BP: (!) 140/88 Pulse: 95   Resp: 18 SpO2: 94 % O2 Device: Nasal cannula Oxygen Delivery: 1 LPM  Weight: 166 lbs 10.68 oz    General Appearance: Well nourished male in NAD  Respiratory: lungs CTAB  Cardiovascular: RRR, normal s1/s2  GI: normal bowel sounds  Skin:   Other: Awake and alert, CN grossly intact      Medical Decision Making       20 MINUTES SPENT BY ME on the date of service doing chart review, history, exam, documentation & further activities per the note.  MANAGEMENT DISCUSSED with the following over the past 24 hours: bedside RN   Tests ORDERED & REVIEWED in the past 24 hours:  - BMP  - CBC  - operative stain and culture, MRSA nasal swab  Medical complexity over the past 24 hours:  - Prescription DRUG MANAGEMENT performed      Data     I have personally reviewed the following data over the past 24 hrs:    8.1  \   12.9 (L)   / 330     142 106 17.4 /  229 (H)   3.7 25 0.59 (L) \       Imaging results reviewed over the past 24 hrs:   Recent Results (from the past 24 hour(s))   MR Foot Left w/o Contrast    Narrative    EXAM: MR FOOT LEFT W/O CONTRAST  LOCATION: [facility]  DATE: 9/30/2024    INDICATION: 2nd toe, evaluate for osteomyelitis  COMPARISON: X-rays September 30, 2024  TECHNIQUE: Unenhanced.    FINDINGS:     JOINTS AND BONES:   -Diffuse abnormal marrow signal with loss of cortex at the second toe distal phalanx.  Much more subtle marrow signal abnormality throughout the middle phalanx is evident only on the sagittal in the short axis TII images, not perceptible on the T1 images.   The proximal phalanx appears normal.    Marrow signal elsewhere throughout the mid and forefoot bones is normal.    TENDONS:   -No tendon tear, tendinopathy, or tenosynovitis.     LIGAMENTS:   -Lisfranc ligament: Intact. No subluxation.    MUSCLES AND SOFT TISSUES:   -Diffuse muscular deconditioning. Widespread soft tissue edema most prominent at the second toe and over the dorsum of the foot. No focal fluid collection.      Impression    IMPRESSION:  1.  Wound at the tip of the second toe with abnormal marrow signal representing osteomyelitis diffusely throughout the distal phalanx. There is more subtle abnormal marrow signal throughout the middle phalanx evident only the fat saturated T2 sequences.   While this may represent reactive osteitis, early osteomyelitis is also consideration. There is no abscess or septic arthritis

## 2024-10-01 NOTE — ANESTHESIA CARE TRANSFER NOTE
Patient: Artem Hernandez    Procedure: Procedure(s):  Left foot partial second digit amputation       Diagnosis: Osteomyelitis of left foot, unspecified type (H) [M86.9]  Diagnosis Additional Information: No value filed.    Anesthesia Type:   MAC     Note:    Oropharynx: oral airway in place  Level of Consciousness: awake  Oxygen Supplementation: face mask  Level of Supplemental Oxygen (L/min / FiO2): 6  Independent Airway: airway patency satisfactory and stable  Dentition: dentition unchanged  Vital Signs Stable: post-procedure vital signs reviewed and stable  Report to RN Given: handoff report given  Patient transferred to: PACU    Handoff Report: Identifed the Patient, Identified the Reponsible Provider, Reviewed the pertinent medical history, Discussed the surgical course, Reviewed Intra-OP anesthesia mangement and issues during anesthesia, Set expectations for post-procedure period and Allowed opportunity for questions and acknowledgement of understanding      Vitals:  Vitals Value Taken Time   BP 95/52 10/01/24 0930   Temp 36.2  C (97.2  F) 10/01/24 0920   Pulse 82 10/01/24 0932   Resp 12 10/01/24 0932   SpO2 100 % 10/01/24 0932   Vitals shown include unfiled device data.    Electronically Signed By: YESSENIA Cespedes CRNA  October 1, 2024  9:34 AM

## 2024-10-01 NOTE — PLAN OF CARE
Orientation: A&Ox4  Aggression Stop Light: Green  Activity: Ind  Diet/BS Checks: NPO BG ACHS  Tele: N/A  IV Access/Drains: R PIV SL  Pain Management: Denies  Abnormal VS/Results: Elevated BG's   Bowel/Bladder: Cont b/b  Skin/Wounds: LLE 2+ edema, L second toe erythema, mepi in place  Consults: Podiatry, PT, OT, CM/SW  D/C Disposition: TBD    Other Info:   -Plan for surgery 10/1 (scheduled for approx 0845).   -Blood cultures pending   -one time dose of 5 units of insulin given at 0200 for 371 BG

## 2024-10-01 NOTE — OP NOTE
PREOPERATIVE DIAGNOSES:     1.  Left foot second digit osteomyelitis     POSTOPERATIVE DIAGNOSES:   1.   Left foot second digit osteomyelitis           PROCEDURE:     1.  Left foot partial second digit amputation       ANESTHESIA:  MAC with local.       HEMOSTASIS:  Electrocautery.       ESTIMATED BLOOD LOSS:  5 mL.       SPECIMENS:  Soft tissue culture, second digit for pathology       MATERIALS:  None    Indications: Artem Hernandez  has an ulcer and active infection to the left foot second digit. Xray and MRI confirm osteomyelitis to the distal toe. Given this, recommendation was made for a partial amputation. Procedure was discussed with patient at length, including all risks and benefits. Patient agrees to planned procedure.     Procedure: Under mild sedation pt was brought into the OR & placed on the operating table in a supine position. A total of 10 cc of .5% marcaine were injected into the base of the second digit.  The foot was scrubbed, prepped and draped in an aseptic manner.  A fish mouth incision with slightly more plantar skin than dorsal skin was made at the level of the IPJ. The incision was started dorsally and carried on plantarly to fully encompass the digit.  The incision was deepened through subcutaneous tissue with care being taken to identify and retract all vital neural and vascular structures. A towel clamp was attached to the distal toe and the site was disarticulated at the PIPJ.     All resected bone was sent to pathology.  All nonviable soft tissue was resected  insuring no necrotic tissue proximal to the amputation remained. Next copious amounts of saline were used to flush the amputation site. Site was then closed with 3.0 prolene suture.  Upon completion of suturing, a non-adhesive sterile dressing was then placed over the surgical site followed by 4 x 4s, kerlix, & an ACE wrap. Cultures were taken consisting of deep soft tissue.    The pt tolerated the procedure & anesthesia well.   He was transferred to the recovery room with vital signs stable and vascular status intact to the left foot.  Following a period of post-op monitoring the pt will return to his hospital bed with the following written and oral post-op instructions:    Keep dressing clean, dry and intact.  Do not remove dressing.  Elevate foot at rest.  Continue IV antibiotics  Contact Dr. Maciel if any post-op questions or arrise.     Intraop findings: Noted necrosis and purulence distally. Proximal tissue viable.     Post op plan: No further surgery planned. Recommend oral antibiotics per cultures. Will order surgical shoe.

## 2024-10-01 NOTE — PROGRESS NOTES
S:  We received an order for a DH2 shoe for the L foot.  Dr Maciel  O:  I met with the patient in room 821-01 and provided a large DH2 shoe without incident. A:  The shoe fits the L foot adequate.    P:  The patient will wear the shoe when up out of bed.  Marcin MANCILLA

## 2024-10-01 NOTE — PLAN OF CARE
rientation: A&Ox4  Aggression Stop Light: Green  Activity: SBA  Diet/BS Checks: Mod carb with BG checks ACHS and carb counts  Tele: N/A  IV Access/Drains: R PIV SL  Pain Management: Denies  Abnormal VS/Results: BG's 348, 329, 215, 123, 229  Bowel/Bladder: Cont b/b  Skin/Wounds: LLE surgical dressing in place   Consults: Podiatry, PT, OT, CM/SW  D/C Disposition: Likely 10/3    Other Info:   -L 2nd toe amputation completed this AM

## 2024-10-01 NOTE — ANESTHESIA POSTPROCEDURE EVALUATION
Patient: Artem Hernandez    Procedure: Procedure(s):  Left foot partial second digit amputation       Anesthesia Type:  MAC    Note:  Disposition: Outpatient   Postop Pain Control: Uneventful            Sign Out: Well controlled pain   PONV: No   Neuro/Psych: Uneventful            Sign Out: Acceptable/Baseline neuro status   Airway/Respiratory: Uneventful            Sign Out: Acceptable/Baseline resp. status   CV/Hemodynamics: Uneventful            Sign Out: Acceptable CV status; No obvious hypovolemia; No obvious fluid overload   Other NRE: NONE   DID A NON-ROUTINE EVENT OCCUR?            Last vitals:  Vitals Value Taken Time   /72 10/01/24 1015   Temp 36.6  C (97.8  F) 10/01/24 1023   Pulse 83 10/01/24 1023   Resp 11 10/01/24 1023   SpO2 96 % 10/01/24 1023   Vitals shown include unfiled device data.    Electronically Signed By: Dinesh Goldberg MD  October 1, 2024  4:48 PM

## 2024-10-01 NOTE — ANESTHESIA PREPROCEDURE EVALUATION
Prior to Admission medications    Medication Sig Start Date End Date Taking? Authorizing Provider   NOVOLIN N NPH U-100 INSULIN 100 unit/mL injection [NOVOLIN N NPH U-100 INSULIN 100 UNIT/ML INJECTION] Give 6 units subcutaneously every morning and 4 units subcutaneously every evening.  11.65 Type 2 with hyperglycemia  Patient taking differently: Inject 4 Units subcutaneously 2 times daily (with meals). 8/27/19  Yes Milton Reyes MD   NOVOLIN R REGULAR U-100 INSULN 100 unit/mL injection [NOVOLIN R REGULAR U-100 INSULN 100 UNIT/ML INJECTION] Check blood sugar three (3) times daily.  11.65 Type 2 with hyperglycemia  ReliOn Lancets (100s)  -  dispense 1  ReliOn Prime Test Strips (50's)  - dispense 1  ReliOn 8 mm 31G 0.3 mL syringe - dispense 1 case  Patient taking differently: Inject 6 Units subcutaneously 2 times daily (before meals). 8/27/19  Yes Milton Reyes MD     Current Facility-Administered Medications   Medication Dose Route Frequency Provider Last Rate Last Admin    [Auto Hold] acetaminophen (TYLENOL) tablet 650 mg  650 mg Oral Q4H PRN Carmen Villaseñor PA-C        Or    [Auto Hold] acetaminophen (TYLENOL) Suppository 650 mg  650 mg Rectal Q4H PRN Carmen Villaseñor PA-C        [Auto Hold] bisacodyl (DULCOLAX) suppository 10 mg  10 mg Rectal Daily PRN Carmen Villaseñor PA-C        [Auto Hold] calcium carbonate (TUMS) chewable tablet 1,000 mg  1,000 mg Oral 4x Daily PRN Carmen Villaseñor PA-C        [Auto Hold] glucose gel 15-30 g  15-30 g Oral Q15 Min PRN Carmen Villaseñor PA-C        Or    [Auto Hold] dextrose 50 % injection 25-50 mL  25-50 mL Intravenous Q15 Min PRN Carmen Villaseñor PA-C        Or    [Auto Hold] glucagon injection 1 mg  1 mg Subcutaneous Q15 Min PRN Carmen Villaseñor PA-C        [Auto Hold] gabapentin (NEURONTIN) capsule 100 mg  100 mg Oral TID Carmen Villaseñor PA-C   100 mg at 09/30/24 2202    [Auto Hold] insulin aspart  (NovoLOG) injection (RAPID ACTING)  1-12 Units Subcutaneous Q4H Regino Trejo MD   8 Units at 10/01/24 0759    [Held by provider] insulin aspart (NovoLOG) injection (RAPID ACTING)   Subcutaneous TID w/meals Carmen Villaseñor PA-C   3 Units at 09/30/24 1837    [Auto Hold] insulin glargine (LANTUS PEN) injection 10 Units  10 Units Subcutaneous At Bedtime Carmen Villaseñor PA-C   10 Units at 09/30/24 2203    [Auto Hold] lidocaine (LMX4) cream   Topical Q1H PRN Carmen Villaseñor PA-C        [Auto Hold] lidocaine 1 % 0.1-1 mL  0.1-1 mL Other Q1H PRN Carmen Villaseñor PA-C        [Auto Hold] ondansetron (ZOFRAN ODT) ODT tab 4 mg  4 mg Oral Q6H PRN Carmen Villaseñor PA-C        Or    [Auto Hold] ondansetron (ZOFRAN) injection 4 mg  4 mg Intravenous Q6H PRN Carmen Villaseñor PA-C        [Auto Hold] piperacillin-tazobactam (ZOSYN) 3.375 g vial to attach to  mL bag  3.375 g Intravenous Q6H Carmen Villaseñor PA-C 0 mL/hr at 09/30/24 1429 3.375 g at 10/01/24 0550    [Auto Hold] polyethylene glycol (MIRALAX) Packet 17 g  17 g Oral BID PRN Carmen Villaseñor PA-C        [Auto Hold] prochlorperazine (COMPAZINE) injection 10 mg  10 mg Intravenous Q6H PRN Carmen Villaseñor PA-C        Or    [Auto Hold] prochlorperazine (COMPAZINE) tablet 10 mg  10 mg Oral Q6H PRN Carmen Villaseñor PA-C        Or    [Auto Hold] prochlorperazine (COMPAZINE) suppository 25 mg  25 mg Rectal Q12H PRN Carmen Villaseñor PA-C        [Auto Hold] senna-docusate (SENOKOT-S/PERICOLACE) 8.6-50 MG per tablet 1 tablet  1 tablet Oral BID PRN Carmen Villaseñor PA-C        Or    [Auto Hold] senna-docusate (SENOKOT-S/PERICOLACE) 8.6-50 MG per tablet 2 tablet  2 tablet Oral BID PRN Carmen Villaseñor PA-C        [Auto Hold] sodium chloride (PF) 0.9% PF flush 3 mL  3 mL Intracatheter Q8H Carmen Villaseñor PA-C   3 mL at 09/30/24 2206    [Auto Hold] sodium  "chloride (PF) 0.9% PF flush 3 mL  3 mL Intracatheter q1 min prn Carmen Villaseñor PA-C        [Auto Hold] vancomycin (VANCOCIN) 1,500 mg in 0.9% NaCl 265 mL intermittent infusion  1,500 mg Intravenous Q12H Carmen Villaseñor PA-C   1,500 mg at 10/01/24 0233     Current Facility-Administered Medications   Medication Dose Route Frequency Provider Last Rate Last Admin     No results for input(s): \"ABO\", \"RH\" in the last 78702 hours.  No results for input(s): \"HCG\" in the last 06786 hours.  Recent Results (from the past 744 hour(s))   XR Toe Left G/E 2 Views    Narrative    XR TOE LEFT G/E 2 VIEWS 9/30/2024 8:55 AM     HISTORY: diabetic, infected, eval for osteo    COMPARISON: None.       Impression    IMPRESSION:   Osteomyelitis distal phalanx of the second toe where there is  cortical destructive change and soft tissue swelling.    NOTE: ABNORMAL REPORT    THE DICTATION ABOVE DESCRIBES AN ABNORMAL REPORT FOR WHICH FOLLOW-UP  IS NEEDED.    YANN SAL MD         SYSTEM ID:  [id number]   MR Foot Left w/o Contrast    Narrative    EXAM: MR FOOT LEFT W/O CONTRAST  LOCATION: [facility]  DATE: 9/30/2024    INDICATION: 2nd toe, evaluate for osteomyelitis  COMPARISON: X-rays September 30, 2024  TECHNIQUE: Unenhanced.    FINDINGS:     JOINTS AND BONES:   -Diffuse abnormal marrow signal with loss of cortex at the second toe distal phalanx. Much more subtle marrow signal abnormality throughout the middle phalanx is evident only on the sagittal in the short axis TII images, not perceptible on the T1 images.   The proximal phalanx appears normal.    Marrow signal elsewhere throughout the mid and forefoot bones is normal.    TENDONS:   -No tendon tear, tendinopathy, or tenosynovitis.     LIGAMENTS:   -Lisfranc ligament: Intact. No subluxation.    MUSCLES AND SOFT TISSUES:   -Diffuse muscular deconditioning. Widespread soft tissue edema most prominent at the second toe and over the dorsum " of the foot. No focal fluid collection.      Impression    IMPRESSION:  1.  Wound at the tip of the second toe with abnormal marrow signal representing osteomyelitis diffusely throughout the distal phalanx. There is more subtle abnormal marrow signal throughout the middle phalanx evident only the fat saturated T2 sequences.   While this may represent reactive osteitis, early osteomyelitis is also consideration. There is no abscess or septic arthritis    Anesthesia Pre-Procedure Evaluation    Patient: Artem Hernandez   MRN: [id number] : 1960        Procedure : Procedure(s):  Left foot partial second digit amputation          No past medical history on file.   No past surgical history on file.   No Known Allergies   Social History     Tobacco Use    Smoking status: Never    Smokeless tobacco: Never   Substance Use Topics    Alcohol use: Yes     Comment: Alcoholic Drinks/day: 2 drinks ca every 2 weeks      Wt Readings from Last 1 Encounters:   10/01/24 75.6 kg (166 lb 10.7 oz)        Anesthesia Evaluation   Pt has had prior anesthetic.         ROS/MED HX  ENT/Pulmonary:    (-) tobacco use and sleep apnea   Neurologic:     (+)    peripheral neuropathy,                            Cardiovascular:     (+) Dyslipidemia - -   -  - -                                      METS/Exercise Tolerance:     Hematologic:       Musculoskeletal:       GI/Hepatic:       Renal/Genitourinary:       Endo:     (+)  type II DM,                 (-) Type I DM   Psychiatric/Substance Use:       Infectious Disease: Comment: Diabetic foot infection      Malignancy:       Other:            Physical Exam    Airway        Mallampati: II    Neck ROM: full     Respiratory Devices and Support         Dental       (+) Modest Abnormalities - crowns, retainers, 1 or 2 missing teeth      Cardiovascular   cardiovascular exam normal          Pulmonary   pulmonary exam normal                OUTSIDE LABS:  CBC:   Lab Results   Component Value Date     "WBC 8.1 10/01/2024    WBC 10.1 09/30/2024    HGB 12.9 (L) 10/01/2024    HGB 14.7 09/30/2024    HCT 38.6 (L) 10/01/2024    HCT 43.0 09/30/2024     10/01/2024     09/30/2024     BMP:   Lab Results   Component Value Date     09/30/2024    POTASSIUM 3.9 09/30/2024    CHLORIDE 101 09/30/2024    CO2 26 09/30/2024    BUN 14.8 09/30/2024    CR 0.50 (L) 09/30/2024     (H) 10/01/2024     (H) 10/01/2024     COAGS: No results found for: \"PTT\", \"INR\", \"FIBR\"  POC: No results found for: \"BGM\", \"HCG\", \"HCGS\"  HEPATIC: No results found for: \"ALBUMIN\", \"PROTTOTAL\", \"ALT\", \"AST\", \"GGT\", \"ALKPHOS\", \"BILITOTAL\", \"BILIDIRECT\", \"SALAZAR\"  OTHER:   Lab Results   Component Value Date    A1C 13.4 (H) 09/30/2024    FRANKI 9.1 09/30/2024    SED 26 (H) 09/30/2024       Anesthesia Plan    ASA Status:  3    NPO Status:  NPO Appropriate    Anesthesia Type: MAC.     - Reason for MAC: immobility needed, straight local not clinically adequate, chronic cardiopulmonary disease              Consents    Anesthesia Plan(s) and associated risks, benefits, and realistic alternatives discussed. Questions answered and patient/representative(s) expressed understanding.     - Discussed:     - Discussed with:  Patient            Postoperative Care    Pain management: IV analgesics.   PONV prophylaxis: Ondansetron (or other 5HT-3)     Comments:               Dinesh Goldberg MD    I have reviewed the pertinent notes and labs in the chart from the past 30 days and (re)examined the patient.  Any updates or changes from those notes are reflected in this note.              # Overweight: Estimated body mass index is 26.1 kg/m  as calculated from the following:    Height as of this encounter: 1.702 m (5' 7\").    Weight as of this encounter: 75.6 kg (166 lb 10.7 oz).      "

## 2024-10-02 ENCOUNTER — APPOINTMENT (OUTPATIENT)
Dept: OCCUPATIONAL THERAPY | Facility: CLINIC | Age: 64
DRG: 617 | End: 2024-10-02
Attending: PHYSICIAN ASSISTANT

## 2024-10-02 LAB
GLUCOSE BLDC GLUCOMTR-MCNC: 169 MG/DL (ref 70–99)
GLUCOSE BLDC GLUCOMTR-MCNC: 205 MG/DL (ref 70–99)
GLUCOSE BLDC GLUCOMTR-MCNC: 214 MG/DL (ref 70–99)
GLUCOSE BLDC GLUCOMTR-MCNC: 307 MG/DL (ref 70–99)
GLUCOSE BLDC GLUCOMTR-MCNC: 357 MG/DL (ref 70–99)
GLUCOSE BLDC GLUCOMTR-MCNC: 398 MG/DL (ref 70–99)

## 2024-10-02 PROCEDURE — 250N000013 HC RX MED GY IP 250 OP 250 PS 637: Performed by: PHYSICIAN ASSISTANT

## 2024-10-02 PROCEDURE — 120N000001 HC R&B MED SURG/OB

## 2024-10-02 PROCEDURE — 250N000013 HC RX MED GY IP 250 OP 250 PS 637: Performed by: HOSPITALIST

## 2024-10-02 PROCEDURE — 250N000013 HC RX MED GY IP 250 OP 250 PS 637: Performed by: PODIATRIST

## 2024-10-02 PROCEDURE — 97535 SELF CARE MNGMENT TRAINING: CPT | Mod: GO | Performed by: OCCUPATIONAL THERAPIST

## 2024-10-02 PROCEDURE — 97165 OT EVAL LOW COMPLEX 30 MIN: CPT | Mod: GO | Performed by: OCCUPATIONAL THERAPIST

## 2024-10-02 PROCEDURE — 999N000147 HC STATISTIC PT IP EVAL DEFER

## 2024-10-02 PROCEDURE — 99232 SBSQ HOSP IP/OBS MODERATE 35: CPT | Performed by: HOSPITALIST

## 2024-10-02 PROCEDURE — 250N000011 HC RX IP 250 OP 636: Performed by: PHYSICIAN ASSISTANT

## 2024-10-02 PROCEDURE — 99231 SBSQ HOSP IP/OBS SF/LOW 25: CPT | Performed by: PODIATRIST

## 2024-10-02 RX ORDER — LISINOPRIL 5 MG/1
5 TABLET ORAL DAILY
Status: DISCONTINUED | OUTPATIENT
Start: 2024-10-02 | End: 2024-10-04 | Stop reason: HOSPADM

## 2024-10-02 RX ADMIN — PIPERACILLIN AND TAZOBACTAM 3.38 G: 3; .375 INJECTION, POWDER, FOR SOLUTION INTRAVENOUS at 20:11

## 2024-10-02 RX ADMIN — ACETAMINOPHEN 975 MG: 325 TABLET ORAL at 13:24

## 2024-10-02 RX ADMIN — PIPERACILLIN AND TAZOBACTAM 3.38 G: 3; .375 INJECTION, POWDER, FOR SOLUTION INTRAVENOUS at 00:18

## 2024-10-02 RX ADMIN — INSULIN ASPART: 100 INJECTION, SOLUTION INTRAVENOUS; SUBCUTANEOUS at 14:28

## 2024-10-02 RX ADMIN — LISINOPRIL 5 MG: 5 TABLET ORAL at 15:45

## 2024-10-02 RX ADMIN — GABAPENTIN 100 MG: 100 CAPSULE ORAL at 15:45

## 2024-10-02 RX ADMIN — INSULIN ASPART 6 UNITS: 100 INJECTION, SOLUTION INTRAVENOUS; SUBCUTANEOUS at 09:39

## 2024-10-02 RX ADMIN — PIPERACILLIN AND TAZOBACTAM 3.38 G: 3; .375 INJECTION, POWDER, FOR SOLUTION INTRAVENOUS at 06:07

## 2024-10-02 RX ADMIN — GABAPENTIN 100 MG: 100 CAPSULE ORAL at 09:37

## 2024-10-02 RX ADMIN — ACETAMINOPHEN 975 MG: 325 TABLET ORAL at 22:39

## 2024-10-02 RX ADMIN — GABAPENTIN 100 MG: 100 CAPSULE ORAL at 22:39

## 2024-10-02 RX ADMIN — SENNOSIDES AND DOCUSATE SODIUM 1 TABLET: 50; 8.6 TABLET ORAL at 20:12

## 2024-10-02 RX ADMIN — ACETAMINOPHEN 975 MG: 325 TABLET ORAL at 06:07

## 2024-10-02 RX ADMIN — PIPERACILLIN AND TAZOBACTAM 3.38 G: 3; .375 INJECTION, POWDER, FOR SOLUTION INTRAVENOUS at 13:49

## 2024-10-02 RX ADMIN — INSULIN ASPART 2 UNITS: 100 INJECTION, SOLUTION INTRAVENOUS; SUBCUTANEOUS at 17:44

## 2024-10-02 ASSESSMENT — ACTIVITIES OF DAILY LIVING (ADL)
ADLS_ACUITY_SCORE: 19
ADLS_ACUITY_SCORE: 18
ADLS_ACUITY_SCORE: 19
ADLS_ACUITY_SCORE: 18
ADLS_ACUITY_SCORE: 19
ADLS_ACUITY_SCORE: 18
ADLS_ACUITY_SCORE: 19
PREVIOUS_RESPONSIBILITIES: MEAL PREP;HOUSEKEEPING;LAUNDRY;SHOPPING;YARDWORK;MEDICATION MANAGEMENT;FINANCES;DRIVING;WORK
ADLS_ACUITY_SCORE: 19

## 2024-10-02 NOTE — PLAN OF CARE
Occupational Therapy Discharge Summary    Reason for therapy discharge:    All goals and outcomes met, no further needs identified.    Progress towards therapy goal(s). See goals on Care Plan in [facility] electronic health record for goal details.  Goals met    Therapy recommendation(s):    No further therapy is recommended. Pt is able to maintain precautions during ambulation without a gait aid, completed stairs with SBA.

## 2024-10-02 NOTE — PLAN OF CARE
Summary: 1900-0730  Primary Diagnosis: L sendon toe Osteomyelitis    Orientation: A&O x4  Aggression Stop Light: Green  Activity: SBA  Diet/BS Checks: Mod carb, ACHS, bedtime   Tele:  NA  IV Access/Drains: R PIV SL  Pain Management: Denied on this, on scheduled tylenol  Abnormal VS/Results: VSS, on RA  Bowel/Bladder: Continent of B&B  Skin/Wounds: Surgical dressing on LLE  Consults: PT/OT, Podiatry  D/C Disposition: Possible discharge tomorrow  Other Info: 6AM , Night covering MD notified

## 2024-10-02 NOTE — PROGRESS NOTES
"[facility]    Medicine Progress Note - Hospitalist Service    Date of Admission:  9/30/2024    Assessment & Plan   Artem Hernandez is a 64 year old male admitted on 9/30/2024.  PMHx of IDDM admitted on 9/30/2024. He presented for further evaluation of L second digit swelling and erythema, found to have osteomyelitis now s/p partial toe amputation.        Osteomyelitis, L second toe s/p partial amputation  *Clipped nail within the past week and subsequently noticed worsening erythema and swelling. Afebrile without leukocytosis on arrival.   *MRI left foot showing abnormal marrow signal diffusely throughout distal 2nd distal phalanx and more subtle signal abnormality of middle phalanx  *underwent uncomplicated partial amputation as above    - Podiatry recs appreciated  - Continue PTA Zosyn  - culture growing Strep agalactaiae and Staph lugdunensis; await sensitivities   - PRN tylenol and gabapentin for pain      T2DM, insulin dependent, uncontrolled, with associated peripheral neuropathy  [NPH and Novolin R BID]  *A1C 13.4 9/30  *Pharmacy liaison consulted for insulin coverage given lack of insurance (see note 9/30)  - increase lantus to 10 units bid  - increase prandial to 1u/10g carb  - high dose ssi  - Gabapentin 100 mg TID initiated for neuropathic pain, uptitrate      Hypertension  *Previously on Lisinopril, but off for a while.   - BP up to 160 systolic today; will start lisinopril 5 mg daily            Diet: Moderate Consistent Carb (60 g CHO per Meal) Diet    DVT Prophylaxis: Pneumatic Compression Devices  Barnes Catheter: Not present  Lines: None     Cardiac Monitoring: None  Code Status: Full Code      Clinically Significant Risk Factors                            # Overweight: Estimated body mass index is 25.84 kg/m  as calculated from the following:    Height as of this encounter: 1.702 m (5' 7\").    Weight as of this encounter: 74.8 kg (165 lb)., PRESENT ON ADMISSION        "     Disposition Plan     Medically Ready for Discharge: Anticipated Tomorrow             Regino Trejo MD  Hospitalist Service  [facility]  Securely message with [employer] (more info)  Text page via [employer] Paging/Directory   ______________________________________________________________________    Interval History   No pain complaints, no fever/chills, no diarrhea, rash or nausea with antibiotic.     Physical Exam   Vital Signs: Temp: 98.2  F (36.8  C) Temp src: Oral BP: 129/82 Pulse: 88   Resp: 14 SpO2: 95 % O2 Device: None (Room air) Oxygen Delivery: 1 LPM  Weight: 165 lbs 0 oz    General Appearance: Well nourished male in NAD, lying in bed  Respiratory: lungs CTAB  Cardiovascular: RRR, normal s1/s2  GI: normal bowel sounds  Skin:   Other: Awake and alert, CN grossly intact      Medical Decision Making       30 MINUTES SPENT BY ME on the date of service doing chart review, history, exam, documentation & further activities per the note.  MANAGEMENT DISCUSSED with the following over the past 24 hours: bedside nurse   Tests ORDERED & REVIEWED in the past 24 hours:  - serial glucose, updated cultures  Medical complexity over the past 24 hours:  - Prescription DRUG MANAGEMENT performed      Data     I have personally reviewed the following data over the past 24 hrs:    N/A  \   N/A   / N/A     N/A N/A N/A /  357 (H)   N/A N/A N/A \       Imaging results reviewed over the past 24 hrs:   No results found for this or any previous visit (from the past 24 hour(s)).

## 2024-10-02 NOTE — PLAN OF CARE
Physical Therapy: Orders received. Chart reviewed and discussed with care team.? Physical Therapy not indicated due to patient mobilizing without assist per OT assessment, no specific PT needs at this time.? Defer discharge recommendations to OT, anticipated per OT that patient will be able to discharge home when medically ready.? Will complete orders.

## 2024-10-02 NOTE — PROGRESS NOTES
[facility]  PROGRESS NOTE    CHIEF COMPLAINT:      I was asked by Carmen Villaseñor PA-C  to evaluate this patient for left foot.    PATIENT HISTORY:  Artem Hernandez is a 64 year old male seen in follow up for his left foot Had a partial toe amputation yesterday. States no pain to site and wants to go home.     Review of Systems:  A 10 point review of systems was performed and is positive for that noted above in the patient history.  All other areas are negative.     PAST MEDICAL HISTORY: Diabetes Mellitus     PAST SURGICAL HISTORY:   Past Surgical History:   Procedure Laterality Date    AMPUTATE TOE(S) Left 10/1/2024    Procedure: Left foot partial second digit amputation;  Surgeon: Radha Maciel DPM;  Location:  OR        MEDICATIONS:  Reviewed in Epic. Current.     ALLERGIES:  No Known Allergies     SOCIAL HISTORY:   Social History     Socioeconomic History    Marital status: Single     Spouse name: Not on file    Number of children: Not on file    Years of education: Not on file    Highest education level: Not on file   Occupational History    Not on file   Tobacco Use    Smoking status: Never    Smokeless tobacco: Never   Substance and Sexual Activity    Alcohol use: Yes     Comment: Alcoholic Drinks/day: 2 drinks ca every 2 weeks    Drug use: Never    Sexual activity: Yes     Partners: Female   Other Topics Concern    Not on file   Social History Narrative    Not on file     Social Determinants of Health     Financial Resource Strain: Low Risk  (9/30/2024)    Financial Resource Strain     Within the past 12 months, have you or your family members you live with been unable to get utilities (heat, electricity) when it was really needed?: No   Food Insecurity: Low Risk  (9/30/2024)    Food Insecurity     Within the past 12 months, did you worry that your food would run out before you got money to buy more?: No     Within the past 12 months, did the food you bought  "just not last and you didn t have money to get more?: No   Transportation Needs: Low Risk  (9/30/2024)    Transportation Needs     Within the past 12 months, has lack of transportation kept you from medical appointments, getting your medicines, non-medical meetings or appointments, work, or from getting things that you need?: No   Physical Activity: Not on file   Stress: Not on file   Social Connections: Not on file   Interpersonal Safety: Low Risk  (9/30/2024)    Interpersonal Safety     Do you feel physically and emotionally safe where you currently live?: Yes     Within the past 12 months, have you been hit, slapped, kicked or otherwise physically hurt by someone?: No     Within the past 12 months, have you been humiliated or emotionally abused in other ways by your partner or ex-partner?: No   Housing Stability: Low Risk  (9/30/2024)    Housing Stability     Do you have housing? : Yes     Are you worried about losing your housing?: No        FAMILY HISTORY: No family history on file.     EXAM:Vitals: BP (!) 160/99 (BP Location: Left arm)   Pulse 91   Temp 97.4  F (36.3  C) (Oral)   Resp 15   Ht 1.702 m (5' 7\")   Wt 74.8 kg (165 lb)   SpO2 96%   BMI 25.84 kg/m    BMI= Body mass index is 25.84 kg/m .    LABS:     Last Comprehensive Metabolic Panel:  Sodium   Date Value Ref Range Status   10/01/2024 142 135 - 145 mmol/L Final     Potassium   Date Value Ref Range Status   10/01/2024 3.7 3.4 - 5.3 mmol/L Final     Chloride   Date Value Ref Range Status   10/01/2024 106 98 - 107 mmol/L Final     Carbon Dioxide (CO2)   Date Value Ref Range Status   10/01/2024 25 22 - 29 mmol/L Final     Anion Gap   Date Value Ref Range Status   10/01/2024 11 7 - 15 mmol/L Final     GLUCOSE BY METER POCT   Date Value Ref Range Status   10/02/2024 307 (H) 70 - 99 mg/dL Final     Urea Nitrogen   Date Value Ref Range Status   10/01/2024 17.4 8.0 - 23.0 mg/dL Final     Creatinine   Date Value Ref Range Status   10/01/2024 0.59 (L) " "0.67 - 1.17 mg/dL Final     GFR Estimate   Date Value Ref Range Status   10/01/2024 >90 >60 mL/min/1.73m2 Final     Comment:     eGFR calculated using 2021 CKD-EPI equation.     Calcium   Date Value Ref Range Status   10/01/2024 8.7 (L) 8.8 - 10.4 mg/dL Final     Comment:     Reference intervals for this test were updated on 7/16/2024 to reflect our healthy population more accurately. There may be differences in the flagging of prior results with similar values performed with this method. Those prior results can be interpreted in the context of the updated reference intervals.     Lab Results   Component Value Date    WBC 10.1 09/30/2024     Lab Results   Component Value Date    RBC 4.94 09/30/2024     Lab Results   Component Value Date    HGB 14.7 09/30/2024     Lab Results   Component Value Date    HCT 43.0 09/30/2024     Lab Results   Component Value Date     09/30/2024      No results found for: \"INR\"     General appearance: Patient is alert and fully cooperative with history & exam.  No sign of distress is noted during the visit.      Respiratory: Breathing is regular & unlabored while sitting.      HEENT: Hearing is intact to spoken word.  Speech is clear.  No gross evidence of visual impairment that would impact ambulation.      Dermatologic: Left foot dressing changed: incision site intact and viable. No dehiscence. No significant drainage. Nontender.      Vascular: Dorsalis pedis and posterior tibial pulses are intact & regular bilaterally.  CFT and skin temperature is normal to both lower extremities.       Neurologic: Lower extremity sensation is diminished, bilateral foot, to light touch.  No evidence of neurological-based weakness or contracture in the lower extremities.       Musculoskeletal: Patient is ambulatory without an assistive device or brace.  No gross foot or ankle deformity noted.       Psychiatric: Affect is pleasant & appropriate.      All cultures:  Recent Labs   Lab 10/01/24  0904 " 09/30/24  1522   CULTURE Culture in progress  See corresponding culture for results No growth after 1 day      Foot, Left; Tissue   0 Result Notes  Culture Culture in progress      1+ Streptococcus agalactiae (Group B Streptococcus) Abnormal    This organism is susceptible to ampicillin, penicillin, vancomycin and the cephalosporins. If treatment is required and your patient is allergic to penicillin, contact the microbiology lab within 5 days to request susceptibility testing.   1+ Staphylococcus lugdunensis Abnormal             IMAGING:   IMPRESSION:   Osteomyelitis distal phalanx of the second toe where there is  cortical destructive change and soft tissue swelling.    ASSESSMENT:  Left foot second digit osteomyelitis now s/p 2nd digit partial amputation on 10/1  Uncontrolled Diabetes --> hba1c: 13.4     MEDICAL DECISION MAKING:   -Discussed all findings with patient. Chart and imaging reviewed.   -Incision site intact and appropriate.   -Recommend discharge tomorrow with sensitivities for wound cultures. Would be appropriate for oral antibitoics.   -WB to heel of LLE   -Will place order for dressing change prior to discharge.   -Will sign off. Will provide outpatient follow up information. Vocera text with questions.           Radha Maciel DPM   [facility]  [phone]

## 2024-10-02 NOTE — PROGRESS NOTES
10/02/24 0900   Appointment Info   Signing Clinician's Name / Credentials (OT) Krystle Joslyn, OTR/L   Living Environment   People in Home significant other  (girlfriend)   Current Living Arrangements condominium   Home Accessibility stairs to enter home;stairs within home   Number of Stairs, Main Entrance 2   Stair Railings, Main Entrance none   Number of Stairs, Within Home, Primary greater than 10 stairs   Stair Railings, Within Home, Primary railings on both sides of stairs   Living Environment Comments Pt lives in a two story condo, all needs met on main level, laundry in basement. 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 Family and friends able to assist with IADLs as needed at discharge   General Information   Onset of Illness/Injury or Date of Surgery 09/30/24   Referring Physician Carmen Villaseñor PA-C   Patient/Family Therapy Goal Statement (OT) Pt would like to return home   Additional Occupational Profile Info/Pertinent History of Current Problem 64 year old male admitted on 9/30/2024.  PMHx of IDDM admitted on 9/30/2024. He presented for further evaluation of L second digit swelling and erythema, found to have osteomyelitis. Admitted for IV antibiotics and podiatry consult.      Osteomyelitis, L second toe s/p partial amputation  *Clipped nail within the past week and subsequently noticed worsening erythema and swelling. Afebrile without leukocytosis on arrival.   *MRI left foot  showing abnormal marrow signal diffusely throughout distal 2nd distal phalanx and more subtle signal abnormality of middle phalanx  *underwent uncomplicated partial amputation as above   Existing Precautions/Restrictions fall;brace worn when out of bed;weight bearing  (wear post op shoe when out of bed)   Left Lower Extremity (Weight-bearing Status) other (see comments)  (heel weight bearing)   Cognitive Status Examination   Orientation Status orientation to person, place and time   Affect/Mental Status (Cognitive) WFL   Follows Commands WFL   Cognitive Status Comments PT appears at his cognitive baseline, good recall of precautions during session   Visual Perception   Visual Impairment/Limitations corrective lenses for reading   Sensory   Sensory Comments Pt reports BLE peripheral neuropathy at baseline   Pain Assessment   Patient Currently in Pain No   Posture   Posture forward head position;protracted shoulders   Range of Motion Comprehensive   Comment, General Range of Motion WFL   Strength Comprehensive (MMT)   Comment, General Manual Muscle Testing (MMT) Assessment LLE weight bearing precautions   Bed Mobility   Comment (Bed Mobility) SBA   Transfers   Transfer Comments CGA   Balance   Balance Comments Mild impairment initially with boot   Activities of Daily Living   BADL Assessment/Intervention bathing;lower body dressing;toileting   Bathing Assessment/Intervention   Comment, (Bathing) Min assist with transfer   Lower Body Dressing Assessment/Training   Comment, (Lower Body Dressing) SBA   Toileting   Comment, (Toileting) SBA   Clinical Impression   Criteria for Skilled Therapeutic Interventions Met (OT) Yes, treatment indicated   OT Diagnosis Decline in ADL independence and safety   Influenced by the following impairments LLE amputation   OT Problem List-Impairments impacting ADL problems related to;balance;mobility;post-surgical precautions   Assessment of Occupational Performance 1-3 Performance Deficits    Identified Performance Deficits Impaired bathing and stairs   Planned Therapy Interventions (OT) ADL retraining;progressive activity/exercise   Clinical Decision Making Complexity (OT) problem focused assessment/low complexity   Risk & Benefits of therapy have been explained evaluation/treatment results reviewed;care plan/treatment goals reviewed;risks/benefits reviewed;current/potential barriers reviewed;participants voiced agreement with care plan;participants included;patient   OT Total Evaluation Time   OT Eval, Low Complexity Minutes (39100) 9   OT Goals   Therapy Frequency (OT) One time eval and treatment   OT Predicted Duration/Target Date for Goal Attainment 10/02/24   OT Goals Lower Body Bathing;OT Goal 1   OT: Lower Body Bathing Modified independent;using adaptive equipment;with precautions;Goal Met   OT: Goal 1 Patient will complete stairs and functional transfers with precautions to improve ADL safety.  (Goal Met)   Self-Care/Home Management   Self-Care/Home Mgmt/ADL, Compensatory, Meal Prep Minutes (19904) 24   Symptoms Noted During/After Treatment (Meal Preparation/Planning Training) none   Treatment Detail/Skilled Intervention OT; Pt agreeable to therapy. Pt educated on heel weight bearing precautions in shoe. Pt donned shoe with SBA and cues for technique. Pt completed sit to stand with CGA, mild instability at first, after cues for stability with heel weight bearing pt ambulated with SBA. Pt educated on howto navigate stairs, after education pt completed with SBA and hand rail. Pt returned to room. Pt educated on shower transfer technique with precautions and why maintaining precautions is so important. Pt demonstrated simulated tub transfer after education with SBA. Pt educated on AE inlcuding shower chair to decrease falls risk. Pt verbalized understanding. Pt has no ufrther OT concerns at this time.   OT Discharge Planning   OT Plan DC   OT Discharge Recommendation (DC Rec) home with assist    OT Rationale for DC Rec Patient appears safe and able to discharge home with intermittent assist with laundry and other IADL tasks from family/significant other.   OT Brief overview of current status Goals of therapy will be to address safe mobility and make recs for d/c to next level of care. Pt and RN will continue to follow all falls risk precautions as documented by RN staff while hospitalized.

## 2024-10-03 LAB
CREAT SERPL-MCNC: 0.57 MG/DL (ref 0.67–1.17)
EGFRCR SERPLBLD CKD-EPI 2021: >90 ML/MIN/1.73M2
GLUCOSE BLDC GLUCOMTR-MCNC: 203 MG/DL (ref 70–99)
GLUCOSE BLDC GLUCOMTR-MCNC: 268 MG/DL (ref 70–99)
GLUCOSE BLDC GLUCOMTR-MCNC: 284 MG/DL (ref 70–99)
GLUCOSE BLDC GLUCOMTR-MCNC: 299 MG/DL (ref 70–99)
POTASSIUM SERPL-SCNC: 4.3 MMOL/L (ref 3.4–5.3)

## 2024-10-03 PROCEDURE — 99232 SBSQ HOSP IP/OBS MODERATE 35: CPT | Performed by: HOSPITALIST

## 2024-10-03 PROCEDURE — 36415 COLL VENOUS BLD VENIPUNCTURE: CPT | Performed by: HOSPITALIST

## 2024-10-03 PROCEDURE — 84132 ASSAY OF SERUM POTASSIUM: CPT | Performed by: HOSPITALIST

## 2024-10-03 PROCEDURE — 250N000013 HC RX MED GY IP 250 OP 250 PS 637: Performed by: PHYSICIAN ASSISTANT

## 2024-10-03 PROCEDURE — 120N000001 HC R&B MED SURG/OB

## 2024-10-03 PROCEDURE — 82565 ASSAY OF CREATININE: CPT | Performed by: HOSPITALIST

## 2024-10-03 PROCEDURE — 250N000013 HC RX MED GY IP 250 OP 250 PS 637: Performed by: HOSPITALIST

## 2024-10-03 PROCEDURE — 250N000011 HC RX IP 250 OP 636: Performed by: PHYSICIAN ASSISTANT

## 2024-10-03 PROCEDURE — 250N000013 HC RX MED GY IP 250 OP 250 PS 637: Performed by: PODIATRIST

## 2024-10-03 RX ADMIN — GABAPENTIN 100 MG: 100 CAPSULE ORAL at 17:32

## 2024-10-03 RX ADMIN — GABAPENTIN 100 MG: 100 CAPSULE ORAL at 08:32

## 2024-10-03 RX ADMIN — PIPERACILLIN AND TAZOBACTAM 3.38 G: 3; .375 INJECTION, POWDER, FOR SOLUTION INTRAVENOUS at 13:42

## 2024-10-03 RX ADMIN — ACETAMINOPHEN 975 MG: 325 TABLET ORAL at 13:43

## 2024-10-03 RX ADMIN — PIPERACILLIN AND TAZOBACTAM 3.38 G: 3; .375 INJECTION, POWDER, FOR SOLUTION INTRAVENOUS at 08:31

## 2024-10-03 RX ADMIN — ACETAMINOPHEN 975 MG: 325 TABLET ORAL at 06:26

## 2024-10-03 RX ADMIN — INSULIN ASPART 5 UNITS: 100 INJECTION, SOLUTION INTRAVENOUS; SUBCUTANEOUS at 10:23

## 2024-10-03 RX ADMIN — PIPERACILLIN AND TAZOBACTAM 3.38 G: 3; .375 INJECTION, POWDER, FOR SOLUTION INTRAVENOUS at 02:19

## 2024-10-03 RX ADMIN — ACETAMINOPHEN 975 MG: 325 TABLET ORAL at 22:28

## 2024-10-03 RX ADMIN — PIPERACILLIN AND TAZOBACTAM 3.38 G: 3; .375 INJECTION, POWDER, FOR SOLUTION INTRAVENOUS at 20:31

## 2024-10-03 RX ADMIN — LISINOPRIL 5 MG: 5 TABLET ORAL at 08:32

## 2024-10-03 RX ADMIN — INSULIN ASPART 5 UNITS: 100 INJECTION, SOLUTION INTRAVENOUS; SUBCUTANEOUS at 18:06

## 2024-10-03 RX ADMIN — GABAPENTIN 100 MG: 100 CAPSULE ORAL at 22:28

## 2024-10-03 ASSESSMENT — ACTIVITIES OF DAILY LIVING (ADL)
ADLS_ACUITY_SCORE: 18

## 2024-10-03 NOTE — DISCHARGE INSTRUCTIONS
Foot wound care: cleanse incision site with wound cleanser. Pad dry. Paint incision with betadine. Cover with gauze, gauze wrap and an ACE bandage.

## 2024-10-03 NOTE — PLAN OF CARE
Goal Outcome Evaluation:    Orientation: A&Ox4  Aggression Stop Light: Green  Activity: Independent with walking shoe when out of bed (heel WB)  Diet/BS Checks: Mod carb with BG checks AC/HS and carb counts, good intake this evening  Tele: N/A  IV Access/Drains: PIV SL with intermittent abx  Pain Management: Denies  Abnormal VS/Results: VSS on RA  Bowel/Bladder: Continent, BM today  Skin/Wounds: LLE surgical dressing CDI  Consults: Podiatry, PT, OT, CM/SW  D/C Disposition: potentially this evening pending culture results  Other Info:   - glucometer teaching started, but will need insulin sliding scale and carb counting teaching pending MD orders at discharge. Added some diabetic info to discharge paperwork

## 2024-10-03 NOTE — PLAN OF CARE
Summary: 1900-0730  Primary Diagnosis: L sendon toe Osteomyelitis    Orientation: A&O x4  Aggression Stop Light: Green  Activity: Independent  Diet/BS Checks: Mod carb, ACHS, bedtime   Tele:  NA  IV Access/Drains: R PIV SL  Pain Management: Denied on this, on scheduled tylenol  Abnormal VS/Results: VSS, on RA  Bowel/Bladder: Continent of B&B  Skin/Wounds: Surgical dressing on LLE, dressing change today by RN  Consults: PT/OT, Podiatry  D/C Disposition: Possible discharge today  Other Info: Glucometer ordered for pt, will need teaching on how to use it

## 2024-10-03 NOTE — PLAN OF CARE
Mental Status: A&O x4  Activity/dangle: Independent in room  Diet: Mod Carb diet  Pain: Denies pain  Barnes/Voiding: Voiding in the bathroom  Tele/Restraints/Iso: N/A  02/LDA: Room air. IV saline locked  D/C Date: TBD  Other Info: BG Check w/ insulin coverage. Left foot dressing changed per order.

## 2024-10-03 NOTE — PROGRESS NOTES
"[facility]    Medicine Progress Note - Hospitalist Service    Date of Admission:  9/30/2024    Assessment & Plan   Artem Hernandez is a 64 year old male admitted on 9/30/2024.  PMHx of IDDM admitted on 9/30/2024. He presented for further evaluation of L second digit swelling and erythema, found to have osteomyelitis now s/p partial toe amputation.        Osteomyelitis, L second toe s/p partial amputation  *Clipped nail within the past week and subsequently noticed worsening erythema and swelling. Afebrile without leukocytosis on arrival.   *MRI left foot showing abnormal marrow signal diffusely throughout distal 2nd distal phalanx and more subtle signal abnormality of middle phalanx  *underwent uncomplicated partial amputation as above    - Podiatry recs appreciated  - Continue PTA Zosyn  - culture growing Strep agalactaiae and Staph lugdunensis; discussed with micro lab and reported sensitivities should return 10/3  - PRN tylenol and gabapentin for pain      T2DM, insulin dependent, uncontrolled, with associated peripheral neuropathy  [NPH and Novolin R BID]  *A1C 13.4 9/30  *Pharmacy liaison consulted for insulin coverage given lack of insurance (see note 9/30)  - continue lantus 10 units bid  - continue prandial 1u/10g carb  - high dose ssi  - Gabapentin 100 mg TID initiated for neuropathic pain     Hypertension  *Previously on Lisinopril, but off for a while.   - continue lisinopril 5 mg daily            Diet: Moderate Consistent Carb (60 g CHO per Meal) Diet    DVT Prophylaxis: Pneumatic Compression Devices  Barnes Catheter: Not present  Lines: None     Cardiac Monitoring: None  Code Status: Full Code      Clinically Significant Risk Factors                            # Overweight: Estimated body mass index is 25.84 kg/m  as calculated from the following:    Height as of this encounter: 1.702 m (5' 7\").    Weight as of this encounter: 74.8 kg (165 lb)., PRESENT ON ADMISSION        "     Disposition Plan     Medically Ready for Discharge: Anticipated Tomorrow             Regino Trejo MD  Hospitalist Service  [facility]  Securely message with [employer] (more info)  Text page via [facility] Paging/Directory   ______________________________________________________________________    Interval History   Doing well.  Eager for discharge but willing to wait for sensitivities.  No diarrhea or nausea.  No pain complaints.     Physical Exam   Vital Signs: Temp: 97.6  F (36.4  C) Temp src: Oral BP: 126/75 Pulse: 85   Resp: 16 SpO2: 96 % O2 Device: None (Room air)    Weight: 165 lbs 0 oz    General Appearance: Well nourished male in NAD, lying in bed  Respiratory: lungs CTAB, no wheezes or crackles  Cardiovascular: RRR, normal s1/s2  GI: normal bowel sounds  Skin:   Other: Awake and alert, CN grossly intact      Medical Decision Making       30 MINUTES SPENT BY ME on the date of service doing chart review, history, exam, documentation & further activities per the note.  MANAGEMENT DISCUSSED with the following over the past 24 hours: bedside RN, care coordinator, micro lab   Tests ORDERED & REVIEWED in the past 24 hours:  - serial glucose, Creatinine, potassium, culture data       Data     I have personally reviewed the following data over the past 24 hrs:    N/A  \   N/A   / N/A     N/A N/A N/A /  299 (H)   4.3 N/A 0.57 (L) \       Imaging results reviewed over the past 24 hrs:   No results found for this or any previous visit (from the past 24 hour(s)).

## 2024-10-03 NOTE — PROGRESS NOTES
Care Management Discharge Note    Discharge Date: 10/03/2024       Discharge Disposition: Home, Home Infusion    Discharge Services: Home Care    Discharge DME: None    Discharge Transportation: family or friend will provide    Private pay costs discussed:  payment based on sliding scale for follow-up appointment    Does the patient's insurance plan have a 3 day qualifying hospital stay waiver?  No    PAS Confirmation Code:    Patient/family educated on Medicare website which has current facility and service quality ratings:      Education Provided on the Discharge Plan: Yes  Persons Notified of Discharge Plans: bedside RN  Patient/Family in Agreement with the Plan: yes    Handoff Referral Completed: no    Additional Information:  Per Dr. Trejo, request scheduled a follow-up at the [facility] with Dr. Miles on October 10th at 1:00 PM.  This appointment was added to patient's AVS.  Met with patient and informed his of his appt and that payment for the appointment will be based on a sliding scale payment.    Addendum: Informed pt that he will need to follow-up with Dr. Maciel in 1-2 weeks.  Patient asked to have this scheduled.  Scheduled pt to see Dr. Maciel-Oct 22, 2024 10:40 AM  (Arrive by 10:25 AM)  Post-Op Visit with VIRGINIA Reyes, JELLY Phillip RN, BSN, OCN   Inpatient Care Coordination [address]  Office: [phone]

## 2024-10-03 NOTE — PLAN OF CARE
Goal Outcome Evaluation:    Orientation: A&Ox4  Aggression Stop Light: Green  Activity: Independent with walking shoe when out of bed (heel WB)  Diet/BS Checks: Mod carb with BG checks AC/HS and carb counts, along with lantus BID  Tele: N/A  IV Access/Drains: New PIV placed, SL with intermittent abx  Pain Management: Denies  Abnormal VS/Results: HTN, MD started on daily lisinopril   Bowel/Bladder: Continent, reports BM today, declined bowel meds this AM  Skin/Wounds: LLE surgical dressing in place, changed by podiatry today with plans to change by RN tomorrow  Consults: Podiatry, PT, OT, CM/SW  D/C Disposition: Likely 10/3 pending wound cultures and final abx plan   Other Info:   -pt will need diabetic glucometer teaching tomorrow (supplies ordered this evening)

## 2024-10-04 VITALS
TEMPERATURE: 97.7 F | OXYGEN SATURATION: 97 % | SYSTOLIC BLOOD PRESSURE: 122 MMHG | DIASTOLIC BLOOD PRESSURE: 70 MMHG | BODY MASS INDEX: 26.97 KG/M2 | HEIGHT: 67 IN | RESPIRATION RATE: 18 BRPM | HEART RATE: 84 BPM | WEIGHT: 171.8 LBS

## 2024-10-04 LAB
BACTERIA TISS BX CULT: ABNORMAL
BACTERIA TISS BX CULT: ABNORMAL
GLUCOSE BLDC GLUCOMTR-MCNC: 266 MG/DL (ref 70–99)
GLUCOSE BLDC GLUCOMTR-MCNC: 287 MG/DL (ref 70–99)
PATH REPORT.COMMENTS IMP SPEC: NORMAL
PATH REPORT.COMMENTS IMP SPEC: NORMAL
PATH REPORT.FINAL DX SPEC: NORMAL
PATH REPORT.GROSS SPEC: NORMAL
PATH REPORT.MICROSCOPIC SPEC OTHER STN: NORMAL
PATH REPORT.RELEVANT HX SPEC: NORMAL
PHOTO IMAGE: NORMAL

## 2024-10-04 PROCEDURE — 99239 HOSP IP/OBS DSCHRG MGMT >30: CPT | Performed by: HOSPITALIST

## 2024-10-04 PROCEDURE — 250N000011 HC RX IP 250 OP 636: Performed by: PHYSICIAN ASSISTANT

## 2024-10-04 PROCEDURE — 250N000013 HC RX MED GY IP 250 OP 250 PS 637: Performed by: HOSPITALIST

## 2024-10-04 PROCEDURE — 250N000013 HC RX MED GY IP 250 OP 250 PS 637: Performed by: PHYSICIAN ASSISTANT

## 2024-10-04 RX ORDER — INSULIN ASPART 100 [IU]/ML
INJECTION, SOLUTION INTRAVENOUS; SUBCUTANEOUS
Qty: 15 ML | Refills: 0 | Status: SHIPPED | OUTPATIENT
Start: 2024-10-04

## 2024-10-04 RX ORDER — GABAPENTIN 100 MG/1
100 CAPSULE ORAL 3 TIMES DAILY
Qty: 90 CAPSULE | Refills: 0 | Status: SHIPPED | OUTPATIENT
Start: 2024-10-04

## 2024-10-04 RX ORDER — ACETAMINOPHEN 325 MG/1
975 TABLET ORAL EVERY 8 HOURS PRN
Status: SHIPPED
Start: 2024-10-04

## 2024-10-04 RX ORDER — LISINOPRIL 5 MG/1
5 TABLET ORAL DAILY
Qty: 30 TABLET | Refills: 1 | Status: SHIPPED | OUTPATIENT
Start: 2024-10-04

## 2024-10-04 RX ADMIN — PIPERACILLIN AND TAZOBACTAM 3.38 G: 3; .375 INJECTION, POWDER, FOR SOLUTION INTRAVENOUS at 02:26

## 2024-10-04 RX ADMIN — AMOXICILLIN AND CLAVULANATE POTASSIUM 1 TABLET: 875; 125 TABLET, FILM COATED ORAL at 08:24

## 2024-10-04 RX ADMIN — LISINOPRIL 5 MG: 5 TABLET ORAL at 08:24

## 2024-10-04 RX ADMIN — INSULIN ASPART 6 UNITS: 100 INJECTION, SOLUTION INTRAVENOUS; SUBCUTANEOUS at 08:25

## 2024-10-04 RX ADMIN — GABAPENTIN 100 MG: 100 CAPSULE ORAL at 08:24

## 2024-10-04 ASSESSMENT — ACTIVITIES OF DAILY LIVING (ADL)
ADLS_ACUITY_SCORE: 18

## 2024-10-04 NOTE — PLAN OF CARE
Goal Outcome Evaluation:       [id number]  Orientation: A&Ox4  Aggression Stop Light: Green  Activity: Independent with walking shoe when out of bed   Diet/BS Checks: Mod carb with BS checks   Tele: N/A  IV Access/Drains: PIV SL with intermittent abx  Pain Management: denies  Abnormal VS/Results: VSS on RA  Bowel/Bladder: Continent  Skin/Wounds: LLE surgical dressing CDI  Consults: Podiatry, PT, OT, CM/SW  D/C Disposition: potentially today pending culture results; needs diabetic education at discharge  Other Info:

## 2024-10-04 NOTE — PLAN OF CARE
Patient discharged at 12:15PM to Discharged to home  IV was discontinued. Pain at time of discharge was 0/10. Belongings returned to patient.  Discharge instructions and medications reviewed with patient.  Patient verbalized understanding and all questions were answered.  Prescriptions given to patient.  At time of discharge, patient condition was stable and left the unit via wheelchair escorted by transport staff.

## 2024-10-04 NOTE — DISCHARGE SUMMARY
"[facility]  Hospitalist Discharge Summary      Date of Admission:  9/30/2024  Date of Discharge:  10/4/2024 12:15 PM  Discharging Provider: Regino Trejo MD  Discharge Service: Hospitalist Service    Discharge Diagnoses   Osteomyelitis of left second toe s/p partial amputation  DM II on long term insulin with associated peripheral neuropathy, uncontrolled  HTN    Clinically Significant Risk Factors     # Overweight: Estimated body mass index is 26.91 kg/m  as calculated from the following:    Height as of this encounter: 1.702 m (5' 7\").    Weight as of this encounter: 77.9 kg (171 lb 12.8 oz).       Follow-ups Needed After Discharge   Follow-up Appointments     Follow-up and recommended labs and tests       Follow up with Radha Maciel DPM at [facility], located at   [address] [place] [address] [address], [place] [address]. Phone number is   [phone]. Call to schedule appt in 1-2 weeks.    You are schedule to see Dr. Miles at the [facility] on October 10th at 1:00 PM for an after hospital stay   follow-up visit.  [facility]   [address] [place] [address]   9.9 mi  [phone]        Follow-up and recommended labs and tests       Follow up with primary care provider, [facility], within 7 days for   hospital follow- up.  The following labs are recommended: BMP.  Also   discuss diabetes management.            Unresulted Labs Ordered in the Past 30 Days of this Admission       Date and Time Order Name Status Description    10/1/2024  9:05 AM Anaerobic Bacterial Culture Routine Preliminary     10/1/2024  9:03 AM Surgical Pathology Exam In process     9/30/2024  1:52 PM Blood Culture Arm, Left Preliminary         These results will be followed up by: hospitalist group; any new positive cultures after discharge should be followed to ensure sensitivity to Augmentin    Discharge Disposition   Discharged to " home  Condition at discharge: Stable    Hospital Course   Artem Hernandez is a 64 year old male admitted on 9/30/2024.  PMHx of IDDM admitted on 9/30/2024. He presented for further evaluation of L second digit swelling and erythema, found to have osteomyelitis now s/p partial toe amputation.        Osteomyelitis, L second toe s/p partial amputation  Clipped nail within the past week and subsequently noticed worsening erythema and swelling. MRI left foot showing abnormal marrow signal diffusely throughout distal 2nd distal phalanx and more subtle signal abnormality of middle phalanx.  Podiatry consulted, underwent uncomplicated partial amputation as above.  Surgical culture growing growing Strep agalactaiae and pan-sensitive Staph lugdunensis so will discharge on Augmentin bid to x5 days to complete total 1 week course antibiotics post-op.  Follow up with Podiatry in clinic for wound check.      T2DM, on long term insulin, uncontrolled, with associated peripheral neuropathy  [PTA regimen of NPH and Novolin R BID]  A1C 13.4% this stay.  Pharmacy liaison consulted for insulin coverage given lack of insurance (see note 9/30).  He will discharge on lantus 15 units bid, prandial novolog 1u/10g carb with high dose sliding scale and follow up with PCP within 1 week for further management.  He was also initiated on gabapentin 100 mg TID for neuropathic pain.      Hypertension  Resumed on lisinopril 5 mg daily this admission.  Follow up with PCP for BMP in 1 week.       Consultations This Hospital Stay   PODIATRY IP CONSULT  PHARMACY LIAISON FOR MEDICATION COVERAGE CONSULT  CARE MANAGEMENT / SOCIAL WORK IP CONSULT  PHARMACY TO DOSE VANCO  PHYSICAL THERAPY ADULT IP CONSULT  OCCUPATIONAL THERAPY ADULT IP CONSULT  CARE MANAGEMENT / SOCIAL WORK IP CONSULT    Code Status   Full Code    Time Spent on this Encounter   I, Regino Trejo MD, personally saw the patient today and spent greater than 30 minutes discharging this  patient.       Regino Trejo MD  [place] [address] [facility]  [address]  [place] [address]  Phone: [phone]  ______________________________________________________________________    Physical Exam   Vital Signs: Temp: 97.7  F (36.5  C) Temp src: Oral BP: 122/70 Pulse: 84   Resp: 18 SpO2: 97 % O2 Device: None (Room air)    Weight: 171 lbs 12.8 oz  General Appearance:  Well nourished male in NAD  Respiratory: lungs CTAB, no wheezes or crackles  Cardiovascular: RRR, normal s1/s2  GI: normal bowel sounds  Skin: foot wrapped in ACE bandage  Other:  Awake and alert, CN grossly intact         Primary Care Physician   [facility]    Discharge Orders      Follow-up and recommended labs and tests     Follow up with Radha Maciel DPM at the [facility], located at [address] [place] [address] [address], [place] [address]. Phone number is [phone]. Call to schedule appt in 1-2 weeks.    You are schedule to see Dr. Miles at the [facility] on October 10th at 1:00 PM for an after hospital stay follow-up visit.  [facility]   [address] [place] [address]   9.9 mi  [phone]     Activity    WB to heel only in surgical shoe. Elevate while at rest. Place ice behind knee for 15 minutes at a time.  Leave dressing to foot  at all times. Do not get wet in the shower.     No dressing changes needed until follow up. If dressing gets wet or has excessive drainage, please remove dressing. Cleanse site with saline. Pad dry and recover. Contact the office if you have any concerns.     Reason for your hospital stay    You were admitted for bone infection of your left second toe for which you underwent partial toe amputation.     Follow-up and recommended labs and tests     Follow up with primary care provider, [facility], within 7 days for hospital follow- up.  The following labs are recommended: BMP.  Also discuss diabetes  management.     Activity    Your activity upon discharge: activity as tolerated; weight bearing to heel of left lower extremity     Monitor and record    Blood glucose: 3 times daily.  Record these and bring to your next primary clinic visit.     Discharge Instructions    To obtain insulin copay cards, go to:  [email or web address] for Novolog (short-acting insulin)  [email or web address] for Lantus (long-acting insulin)     Diet    Follow this diet upon discharge:       Moderate Consistent Carb (60 g CHO per Meal) Diet       Significant Results and Procedures   Most Recent 3 CBC's:  Recent Labs   Lab Test 10/01/24  0749 09/30/24  0842   WBC 8.1 10.1   HGB 12.9* 14.7   MCV 87 87    361     Most Recent 3 BMP's:  Recent Labs   Lab Test 10/04/24  0750 10/04/24  0229 10/03/24  2151 10/03/24  1349 10/03/24  0926 10/01/24  0750 10/01/24  0749 09/30/24  1455 09/30/24  0842   NA  --   --   --   --   --   --  142  --  137   POTASSIUM  --   --   --   --  4.3  --  3.7  --  3.9   CHLORIDE  --   --   --   --   --   --  106  --  101   CO2  --   --   --   --   --   --  25  --  26   BUN  --   --   --   --   --   --  17.4  --  14.8   CR  --   --   --   --  0.57*  --  0.59*  --  0.50*   ANIONGAP  --   --   --   --   --   --  11  --  10   FRANKI  --   --   --   --   --   --  8.7*  --  9.1   * 266* 203*   < >  --    < > 348*   < > 300*    < > = values in this interval not displayed.     7-Day Micro Results       Collected Updated Procedure Result Status      10/01/2024 0904 10/04/2024 1346 Anaerobic Bacterial Culture Routine [[id number]]   Tissue from Foot, Left    Preliminary result Component Value   Culture No anaerobic organisms isolated after 3 days  [P]                10/01/2024 0904 10/01/2024 1412 Gram Stain [[id number]]   Tissue from Foot, Left    Final result Component Value   GS Culture See corresponding culture for results   Gram Stain Result No organisms seen   Gram Stain Result 1+ WBC seen            10/01/2024 0904  10/04/2024 0220 Tissue Aerobic Bacterial Culture Routine [[id number]]    (Abnormal)   Tissue from Foot, Left    Final result Component Value   Culture 1+ Streptococcus agalactiae (Group B Streptococcus)    This organism is susceptible to ampicillin, penicillin, vancomycin and the cephalosporins. If treatment is required and your patient is allergic to penicillin, contact the microbiology lab within 5 days to request susceptibility testing.    1+ Staphylococcus lugdunensis        Susceptibility        Staphylococcus lugdunensis      SOCORRO      Clindamycin 0.25 ug/mL Susceptible      Daptomycin 0.5 ug/mL Susceptible      Doxycycline <=0.5 ug/mL Susceptible      Erythromycin <=0.25 ug/mL Susceptible      Gentamicin <=0.5 ug/mL Susceptible      Oxacillin 2 ug/mL Susceptible  [1]       Tetracycline <=1 ug/mL Susceptible      Trimethoprim/Sulfamethoxazole <=0.5/9.5 ug/mL Susceptible      Vancomycin <=0.5 ug/mL Susceptible                   [1]  Oxacillin susceptible isolates are susceptible to cephalosporins (example: cefazolin and cephalexin) and beta lactam combination agents. Oxacillin resistant isolates are resistant to these agents.                    09/30/2024 1522 10/03/2024 1901 Blood Culture Arm, Left [[id number]]   Blood from Arm, Left    Preliminary result Component Value   Culture No growth after 3 days  [P]                09/30/2024 1213 09/30/2024 1610 MRSA MSSA PCR, Nasal Swab [[id number]]    Swab from Nares, Bilateral    Final result Component Value   MRSA Target DNA Negative   SA Target DNA Positive                  Most Recent Hemoglobin A1c:  Recent Labs   Lab Test 09/30/24  0842   A1C 13.4*     Most Recent 6 glucoses:  Recent Labs   Lab Test 10/04/24  0750 10/04/24  0229 10/03/24  2151 10/03/24  1652 10/03/24  1349 10/03/24  0805   * 266* 203* 299* 268* 284*   ,   Results for orders placed or performed during the hospital encounter of 09/30/24   XR Toe Left G/E 2 Views    Narrative    XR  TOE LEFT G/E 2 VIEWS 9/30/2024 8:55 AM     HISTORY: diabetic, infected, eval for osteo    COMPARISON: None.       Impression    IMPRESSION:   Osteomyelitis distal phalanx of the second toe where there is  cortical destructive change and soft tissue swelling.    NOTE: ABNORMAL REPORT    THE DICTATION ABOVE DESCRIBES AN ABNORMAL REPORT FOR WHICH FOLLOW-UP  IS NEEDED.    YANN SAL MD         SYSTEM ID:  [id number]   MR Foot Left w/o Contrast    Narrative    EXAM: MR FOOT LEFT W/O CONTRAST  LOCATION: [facility]  DATE: 9/30/2024    INDICATION: 2nd toe, evaluate for osteomyelitis  COMPARISON: X-rays September 30, 2024  TECHNIQUE: Unenhanced.    FINDINGS:     JOINTS AND BONES:   -Diffuse abnormal marrow signal with loss of cortex at the second toe distal phalanx. Much more subtle marrow signal abnormality throughout the middle phalanx is evident only on the sagittal in the short axis TII images, not perceptible on the T1 images.   The proximal phalanx appears normal.    Marrow signal elsewhere throughout the mid and forefoot bones is normal.    TENDONS:   -No tendon tear, tendinopathy, or tenosynovitis.     LIGAMENTS:   -Lisfranc ligament: Intact. No subluxation.    MUSCLES AND SOFT TISSUES:   -Diffuse muscular deconditioning. Widespread soft tissue edema most prominent at the second toe and over the dorsum of the foot. No focal fluid collection.      Impression    IMPRESSION:  1.  Wound at the tip of the second toe with abnormal marrow signal representing osteomyelitis diffusely throughout the distal phalanx. There is more subtle abnormal marrow signal throughout the middle phalanx evident only the fat saturated T2 sequences.   While this may represent reactive osteitis, early osteomyelitis is also consideration. There is no abscess or septic arthritis       Discharge Medications   Current Discharge Medication List        START taking these medications    Details   acetaminophen (TYLENOL)  325 MG tablet Take 3 tablets (975 mg) by mouth every 8 hours as needed for mild pain.    Associated Diagnoses: Osteomyelitis of left foot, unspecified type (H)      amoxicillin-clavulanate (AUGMENTIN) 875-125 MG tablet Take 1 tablet by mouth every 12 hours for 9 doses.  Qty: 9 tablet, Refills: 0    Associated Diagnoses: Osteomyelitis of left foot, unspecified type (H)      blood glucose (NO BRAND SPECIFIED) lancets standard To use to test glucose level in the blood Use to test blood sugar  3  times daily as directed. To accompany glucose monitor brands per insurance coverage.  Qty: 200 each, Refills: 1    Associated Diagnoses: Type 2 diabetes mellitus with hyperglycemia, with long-term current use of insulin (H)      blood glucose (NO BRAND SPECIFIED) test strip To use to test glucose level in the blood Use to test blood sugar  3 times daily as directed. To accompany glucose monitor brands per insurance coverage.  Qty: 100 strip, Refills: 0    Associated Diagnoses: Type 2 diabetes mellitus with hyperglycemia, with long-term current use of insulin (H)      blood glucose calibration (NO BRAND SPECIFIED) solution Used to calibrate the blood glucose monitor as needed and as directed.  To accompany  blood glucose brands per insurance coverage  Qty: 1 each, Refills: 0    Associated Diagnoses: Type 2 diabetes mellitus with hyperglycemia, with long-term current use of insulin (H)      blood glucose monitoring (NO BRAND SPECIFIED) meter device kit Use as directed Per insurance coverage  Qty: 1 kit, Refills: 0    Comments: Please provide Accu-Chek Guide test strips + Accu-Chek Guide Me meter + 100 Softclix or Fastclix lancets as per Pharmacy Liaison note 9/30/24 as this is cheapest option  Associated Diagnoses: Type 2 diabetes mellitus with hyperglycemia, with long-term current use of insulin (H)      gabapentin (NEURONTIN) 100 MG capsule Take 1 capsule (100 mg) by mouth 3 times daily.  Qty: 90 capsule, Refills: 0     Associated Diagnoses: Type 2 diabetes mellitus with diabetic neuropathy, with long-term current use of insulin (H)      glucose (BD GLUCOSE) 4 g chewable tablet Take 4 tablets by mouth every 15 minutes as needed for low blood sugar.  Qty: 50 tablet, Refills: 1    Associated Diagnoses: Type 2 diabetes mellitus with hyperglycemia, with long-term current use of insulin (H)      !! insulin aspart (NOVOLOG FLEXPEN) 100 UNIT/ML pen Novolog Flexpen If Pre-meal Glucose 140 -164 give 1 unit 165 -189 give 2 units 190 -214 give 3 units 215 -239 give 4 units 240 -264 give 5 units 265 -289 give 6 units 290 -314 give 7 units 315 -339 give 8 units 340+ give 9 units If Bedtime Glucose 200 -214 give 1 unit 215 -264 give 2 units 265 -314 give 3 units 315+ give 4 units  Qty: 15 mL, Refills: 0    Associated Diagnoses: Type 2 diabetes mellitus with diabetic neuropathy, with long-term current use of insulin (H)      !! insulin aspart (NOVOLOG FLEXPEN) 100 UNIT/ML pen Novolog Flexpen 1 units per 10 gram carb unit before breakfast, lunch and dinner  Qty: 15 mL, Refills: 0    Associated Diagnoses: Type 2 diabetes mellitus with diabetic neuropathy, with long-term current use of insulin (H)      insulin glargine (LANTUS PEN) 100 UNIT/ML pen Inject 15 Units subcutaneously 2 times daily.  Qty: 15 mL, Refills: 0    Comments: Patient currently without insurance coverage, prescribing lantus and novolog and being provided copay card information as documented in pharmacy liaison consult note 9/30/24.  Associated Diagnoses: Type 2 diabetes mellitus with diabetic neuropathy, with long-term current use of insulin (H)      lisinopril (ZESTRIL) 5 MG tablet Take 1 tablet (5 mg) by mouth daily.  Qty: 30 tablet, Refills: 1    Associated Diagnoses: Benign essential hypertension       !! - Potential duplicate medications found. Please discuss with provider.        STOP taking these medications       NOVOLIN N NPH U-100 INSULIN 100 unit/mL injection Comments:    Reason for Stopping:         NOVOLIN R REGULAR U-100 INSULN 100 unit/mL injection Comments:   Reason for Stopping:             Allergies   No Known Allergies

## 2024-10-05 LAB
BACTERIA BLD CULT: NO GROWTH
BACTERIA TISS BX CULT: NORMAL

## 2024-10-22 ENCOUNTER — OFFICE VISIT (OUTPATIENT)
Dept: PODIATRY | Facility: CLINIC | Age: 64
End: 2024-10-22
Attending: PODIATRIST

## 2024-10-22 VITALS
DIASTOLIC BLOOD PRESSURE: 90 MMHG | HEIGHT: 67 IN | SYSTOLIC BLOOD PRESSURE: 160 MMHG | BODY MASS INDEX: 26.84 KG/M2 | WEIGHT: 171 LBS

## 2024-10-22 DIAGNOSIS — Z89.422 STATUS POST AMPUTATION OF LESSER TOE OF LEFT FOOT (H): Primary | ICD-10-CM

## 2024-10-22 DIAGNOSIS — L08.9 LEFT FOOT INFECTION: ICD-10-CM

## 2024-10-22 PROCEDURE — 99213 OFFICE O/P EST LOW 20 MIN: CPT | Performed by: PODIATRIST

## 2024-10-22 NOTE — LETTER
10/22/2024      Artem Hernandez  [address] [address]      Dear Colleague,    Thank you for referring your patient, Artem Hernandez, to the [facility] P[facility]. Please see a copy of my visit note below.    [place] PODIATRY/FOOT & ANKLE SURGERY  CLINIC NOTE    CHIEF COMPLAINT:  left foot    PATIENT HISTORY:  Artem Hernandez is a 64 year old male  who presents for follow up for his left foot. He was seen for a foot infection and ulcer to the second toe. He had a partial amputation on 10/1/24. States no issues since surgery. Denies pain. Denies N/F/V/C/D. Denies significant drainage or issues.         Review of Systems:  A 10 point review of systems was performed and is positive for that noted above in the patient history.  All other areas are negative.     PAST MEDICAL HISTORY: No past medical history on file.     PAST SURGICAL HISTORY:   Past Surgical History:   Procedure Laterality Date     AMPUTATE TOE(S) Left 10/1/2024    Procedure: Left foot partial second digit amputation;  Surgeon: Radha Maciel DPM;  Location:  OR        MEDICATIONS:  Reviewed in Epic.     ALLERGIES:  No Known Allergies     SOCIAL HISTORY:   Social History     Socioeconomic History     Marital status: Single     Spouse name: Not on file     Number of children: Not on file     Years of education: Not on file     Highest education level: Not on file   Occupational History     Not on file   Tobacco Use     Smoking status: Never     Smokeless tobacco: Never   Substance and Sexual Activity     Alcohol use: Yes     Comment: Alcoholic Drinks/day: 2 drinks ca every 2 weeks     Drug use: Never     Sexual activity: Yes     Partners: Female   Other Topics Concern     Not on file   Social History Narrative     Not on file     Social Determinants of Health     Financial Resource Strain: Low Risk  (9/30/2024)    Financial Resource Strain      Within the past 12 months, have you or your family members  "you live with been unable to get utilities (heat, electricity) when it was really needed?: No   Food Insecurity: Low Risk  (9/30/2024)    Food Insecurity      Within the past 12 months, did you worry that your food would run out before you got money to buy more?: No      Within the past 12 months, did the food you bought just not last and you didn t have money to get more?: No   Transportation Needs: Low Risk  (9/30/2024)    Transportation Needs      Within the past 12 months, has lack of transportation kept you from medical appointments, getting your medicines, non-medical meetings or appointments, work, or from getting things that you need?: No   Physical Activity: Not on file   Stress: Not on file   Social Connections: Not on file   Interpersonal Safety: Low Risk  (9/30/2024)    Interpersonal Safety      Do you feel physically and emotionally safe where you currently live?: Yes      Within the past 12 months, have you been hit, slapped, kicked or otherwise physically hurt by someone?: No      Within the past 12 months, have you been humiliated or emotionally abused in other ways by your partner or ex-partner?: No   Housing Stability: Low Risk  (9/30/2024)    Housing Stability      Do you have housing? : Yes      Are you worried about losing your housing?: No        FAMILY HISTORY: No family history on file.     EXAM:Vitals: BP (!) 160/90   Ht 1.702 m (5' 7\")   Wt 77.6 kg (171 lb)   BMI 26.78 kg/m    BMI= Body mass index is 26.78 kg/m .      General appearance: Patient is alert and fully cooperative with history & exam.  No sign of distress is noted during the visit.      Respiratory: Breathing is regular & unlabored while sitting.      HEENT: Hearing is intact to spoken word.  Speech is clear.  No gross evidence of visual impairment that would impact ambulation.      Dermatologic: Left foot second digit site, now closed and epithelialized. Sutures removed. No dehiscence. No cellulitis.      Vascular: Dorsalis " pedis and posterior tibial pulses are intact & regular bilaterally.  CFT and skin temperature is normal to both lower extremities.       Neurologic: Lower extremity sensation is diminished, bilateral foot, to light touch.  No evidence of neurological-based weakness or contracture in the lower extremities.       Musculoskeletal: Patient is ambulatory without an assistive device or brace.  No gross foot or ankle deformity noted.  Hammered digits noted.     Psychiatric: Affect is pleasant & appropriate.      Foot, Left; Tissue   0 Result Notes  Culture 1+ Streptococcus agalactiae (Group B Streptococcus) Abnormal    This organism is susceptible to ampicillin, penicillin, vancomycin and the cephalosporins. If treatment is required and your patient is allergic to penicillin, contact the microbiology lab within 5 days to request susceptibility testing.   1+ Staphylococcus lugdunensis Abnormal            Resulting Agency: [facility]     Susceptibility     Staphylococcus lugdunensis     SOCOROR     Clindamycin 0.25 ug/mL Susceptible     Daptomycin 0.5 ug/mL Susceptible     Doxycycline <=0.5 ug/mL Susceptible     Erythromycin <=0.25 ug/mL Susceptible     Gentamicin <=0.5 ug/mL Susceptible     Oxacillin 2 ug/mL Susceptible 1     Tetracycline <=1 ug/mL Susceptible     Trimethoprim/Sulfamethoxazole <=0.5/9.5 u... Susceptible     Vancomycin <=0.5 ug/mL Susceptible            inal Diagnosis   A.  Left foot second toe, partial amputation:  -Skin with ulcer, marked acute inflammation and marked acute osteomyelitis in the distal phalanx.   -Middle phalanx and margin of resection are negative for osteomyelitis.       IMAGING:     ASSESSMENT:  S/p left foot partial second digit amputation on 10/1  Uncontrolled Diabetes --> hba1c: 13.4    MEDICAL DECISION MAKING:   -Discussed all findings with patient. Chart and imaging reviewed.   -Left foot second digit site healed and sutures removed. Okay to begin getting foot wet in the shower and stop  bandaging   -Discussed at length need to monitor adjacent toes for new or future problems. Discussed risks of recurrent ulcers  -Discussed need for tight glycemic control  -Discussed appropriate shoegear   -Follow up in 3-4 weeks if needed for any new problems.     Radha Maciel DPM   [place] Department of Podiatry/Foot & Ankle Surgery                Again, thank you for allowing me to participate in the care of your patient.        Sincerely,        Radha Maciel DPM

## 2024-10-22 NOTE — PROGRESS NOTES
[facility]  CLINIC NOTE    CHIEF COMPLAINT:  left foot    PATIENT HISTORY:  Artem Hernandez is a 64 year old male  who presents for follow up for his left foot. He was seen for a foot infection and ulcer to the second toe. He had a partial amputation on 10/1/24. States no issues since surgery. Denies pain. Denies N/F/V/C/D. Denies significant drainage or issues.         Review of Systems:  A 10 point review of systems was performed and is positive for that noted above in the patient history.  All other areas are negative.     PAST MEDICAL HISTORY: No past medical history on file.     PAST SURGICAL HISTORY:   Past Surgical History:   Procedure Laterality Date    AMPUTATE TOE(S) Left 10/1/2024    Procedure: Left foot partial second digit amputation;  Surgeon: Radha Maciel DPM;  Location:  OR        MEDICATIONS:  Reviewed in Epic.     ALLERGIES:  No Known Allergies     SOCIAL HISTORY:   Social History     Socioeconomic History    Marital status: Single     Spouse name: Not on file    Number of children: Not on file    Years of education: Not on file    Highest education level: Not on file   Occupational History    Not on file   Tobacco Use    Smoking status: Never    Smokeless tobacco: Never   Substance and Sexual Activity    Alcohol use: Yes     Comment: Alcoholic Drinks/day: 2 drinks ca every 2 weeks    Drug use: Never    Sexual activity: Yes     Partners: Female   Other Topics Concern    Not on file   Social History Narrative    Not on file     Social Determinants of Health     Financial Resource Strain: Low Risk  (9/30/2024)    Financial Resource Strain     Within the past 12 months, have you or your family members you live with been unable to get utilities (heat, electricity) when it was really needed?: No   Food Insecurity: Low Risk  (9/30/2024)    Food Insecurity     Within the past 12 months, did you worry that your food would run out before you got money to buy more?: No  "    Within the past 12 months, did the food you bought just not last and you didn t have money to get more?: No   Transportation Needs: Low Risk  (9/30/2024)    Transportation Needs     Within the past 12 months, has lack of transportation kept you from medical appointments, getting your medicines, non-medical meetings or appointments, work, or from getting things that you need?: No   Physical Activity: Not on file   Stress: Not on file   Social Connections: Not on file   Interpersonal Safety: Low Risk  (9/30/2024)    Interpersonal Safety     Do you feel physically and emotionally safe where you currently live?: Yes     Within the past 12 months, have you been hit, slapped, kicked or otherwise physically hurt by someone?: No     Within the past 12 months, have you been humiliated or emotionally abused in other ways by your partner or ex-partner?: No   Housing Stability: Low Risk  (9/30/2024)    Housing Stability     Do you have housing? : Yes     Are you worried about losing your housing?: No        FAMILY HISTORY: No family history on file.     EXAM:Vitals: BP (!) 160/90   Ht 1.702 m (5' 7\")   Wt 77.6 kg (171 lb)   BMI 26.78 kg/m    BMI= Body mass index is 26.78 kg/m .      General appearance: Patient is alert and fully cooperative with history & exam.  No sign of distress is noted during the visit.      Respiratory: Breathing is regular & unlabored while sitting.      HEENT: Hearing is intact to spoken word.  Speech is clear.  No gross evidence of visual impairment that would impact ambulation.      Dermatologic: Left foot second digit site, now closed and epithelialized. Sutures removed. No dehiscence. No cellulitis.      Vascular: Dorsalis pedis and posterior tibial pulses are intact & regular bilaterally.  CFT and skin temperature is normal to both lower extremities.       Neurologic: Lower extremity sensation is diminished, bilateral foot, to light touch.  No evidence of neurological-based weakness or " contracture in the lower extremities.       Musculoskeletal: Patient is ambulatory without an assistive device or brace.  No gross foot or ankle deformity noted.  Hammered digits noted.     Psychiatric: Affect is pleasant & appropriate.      Foot, Left; Tissue   0 Result Notes  Culture 1+ Streptococcus agalactiae (Group B Streptococcus) Abnormal    This organism is susceptible to ampicillin, penicillin, vancomycin and the cephalosporins. If treatment is required and your patient is allergic to penicillin, contact the microbiology lab within 5 days to request susceptibility testing.   1+ Staphylococcus lugdunensis Abnormal            Resulting Agency: [facility]     Susceptibility     Staphylococcus lugdunensis     [facility]     Clindamycin 0.25 ug/mL Susceptible     Daptomycin 0.5 ug/mL Susceptible     Doxycycline <=0.5 ug/mL Susceptible     Erythromycin <=0.25 ug/mL Susceptible     Gentamicin <=0.5 ug/mL Susceptible     Oxacillin 2 ug/mL Susceptible 1     Tetracycline <=1 ug/mL Susceptible     Trimethoprim/Sulfamethoxazole <=0.5/9.5 u... Susceptible     Vancomycin <=0.5 ug/mL Susceptible            inal Diagnosis   A.  Left foot second toe, partial amputation:  -Skin with ulcer, marked acute inflammation and marked acute osteomyelitis in the distal phalanx.   -Middle phalanx and margin of resection are negative for osteomyelitis.       IMAGING:     ASSESSMENT:  S/p left foot partial second digit amputation on 10/1  Uncontrolled Diabetes --> hba1c: 13.4    MEDICAL DECISION MAKING:   -Discussed all findings with patient. Chart and imaging reviewed.   -Left foot second digit site healed and sutures removed. Okay to begin getting foot wet in the shower and stop bandaging   -Discussed at length need to monitor adjacent toes for new or future problems. Discussed risks of recurrent ulcers  -Discussed need for tight glycemic control  -Discussed appropriate shoegear   -Follow up in 3-4 weeks if needed for any new problems.     Radha  [facility]IR[place] Maciel Department of Podiatry/Foot & Ankle Surgery

## 2024-11-18 ENCOUNTER — LAB REQUISITION (OUTPATIENT)
Dept: LAB | Facility: CLINIC | Age: 64
End: 2024-11-18

## 2024-11-18 DIAGNOSIS — E11.9 TYPE 2 DIABETES MELLITUS WITHOUT COMPLICATIONS (H): ICD-10-CM

## 2024-11-18 LAB
ALBUMIN SERPL BCG-MCNC: 4.2 G/DL (ref 3.5–5.2)
ALP SERPL-CCNC: 84 U/L (ref 40–150)
ALT SERPL W P-5'-P-CCNC: 22 U/L (ref 0–70)
ANION GAP SERPL CALCULATED.3IONS-SCNC: 12 MMOL/L (ref 7–15)
AST SERPL W P-5'-P-CCNC: 21 U/L (ref 0–45)
BILIRUB SERPL-MCNC: 0.2 MG/DL
BUN SERPL-MCNC: 20.1 MG/DL (ref 8–23)
CALCIUM SERPL-MCNC: 9.5 MG/DL (ref 8.8–10.4)
CHLORIDE SERPL-SCNC: 104 MMOL/L (ref 98–107)
CREAT SERPL-MCNC: 0.57 MG/DL (ref 0.67–1.17)
EGFRCR SERPLBLD CKD-EPI 2021: >90 ML/MIN/1.73M2
GLUCOSE SERPL-MCNC: 188 MG/DL (ref 70–99)
HCO3 SERPL-SCNC: 26 MMOL/L (ref 22–29)
POTASSIUM SERPL-SCNC: 4.2 MMOL/L (ref 3.4–5.3)
PROT SERPL-MCNC: 7.4 G/DL (ref 6.4–8.3)
SODIUM SERPL-SCNC: 142 MMOL/L (ref 135–145)

## 2024-11-18 PROCEDURE — 80053 COMPREHEN METABOLIC PANEL: CPT | Performed by: NURSE PRACTITIONER

## 2024-11-18 PROCEDURE — 84155 ASSAY OF PROTEIN SERUM: CPT | Performed by: NURSE PRACTITIONER

## 2025-05-27 ENCOUNTER — LAB REQUISITION (OUTPATIENT)
Dept: LAB | Facility: CLINIC | Age: 65
End: 2025-05-27

## 2025-05-27 DIAGNOSIS — Z13.220 ENCOUNTER FOR SCREENING FOR LIPOID DISORDERS: ICD-10-CM

## 2025-05-27 DIAGNOSIS — Z11.59 ENCOUNTER FOR SCREENING FOR OTHER VIRAL DISEASES: ICD-10-CM

## 2025-05-27 DIAGNOSIS — Z11.4 ENCOUNTER FOR SCREENING FOR HUMAN IMMUNODEFICIENCY VIRUS (HIV): ICD-10-CM

## 2025-05-27 DIAGNOSIS — E11.65 TYPE 2 DIABETES MELLITUS WITH HYPERGLYCEMIA (H): ICD-10-CM

## 2025-05-27 LAB
CHOLEST SERPL-MCNC: 167 MG/DL
CREAT UR-MCNC: 100 MG/DL
FASTING STATUS PATIENT QL REPORTED: ABNORMAL
HCV AB SERPL QL IA: NONREACTIVE
HDLC SERPL-MCNC: 46 MG/DL
HIV 1+2 AB+HIV1 P24 AG SERPL QL IA: NONREACTIVE
LDLC SERPL CALC-MCNC: 84 MG/DL
MICROALBUMIN UR-MCNC: 540 MG/L
MICROALBUMIN/CREAT UR: 540 MG/G CR (ref 0–17)
NONHDLC SERPL-MCNC: 121 MG/DL
TRIGL SERPL-MCNC: 183 MG/DL

## 2025-05-27 PROCEDURE — 86803 HEPATITIS C AB TEST: CPT | Performed by: STUDENT IN AN ORGANIZED HEALTH CARE EDUCATION/TRAINING PROGRAM

## 2025-05-27 PROCEDURE — 87389 HIV-1 AG W/HIV-1&-2 AB AG IA: CPT | Performed by: STUDENT IN AN ORGANIZED HEALTH CARE EDUCATION/TRAINING PROGRAM

## 2025-05-27 PROCEDURE — 80061 LIPID PANEL: CPT | Performed by: STUDENT IN AN ORGANIZED HEALTH CARE EDUCATION/TRAINING PROGRAM

## 2025-05-27 PROCEDURE — 82570 ASSAY OF URINE CREATININE: CPT | Performed by: STUDENT IN AN ORGANIZED HEALTH CARE EDUCATION/TRAINING PROGRAM

## (undated) DEVICE — PACK EXTREMITY SOP15EXFSD

## (undated) DEVICE — GLOVE BIOGEL PI SZ 6.5 40865

## (undated) DEVICE — LINEN TOWEL PACK X5 5464

## (undated) DEVICE — DECANTER BAG 2002S

## (undated) DEVICE — SOL WATER IRRIG 1000ML BOTTLE 2F7114

## (undated) DEVICE — GLOVE BIOGEL PI MICRO INDICATOR UNDERGLOVE SZ 7.0 48970

## (undated) DEVICE — ESU GROUND PAD UNIVERSAL W/O CORD

## (undated) DEVICE — PREP SKIN SCRUB TRAY 4461A

## (undated) DEVICE — SU VICRYL 2-0 CP-2 27" UND J869H

## (undated) DEVICE — ESU GROUND PAD E7506

## (undated) DEVICE — SU PROLENE 2-0 FS 18" 8685H

## (undated) RX ORDER — FENTANYL CITRATE 50 UG/ML
INJECTION, SOLUTION INTRAMUSCULAR; INTRAVENOUS
Status: DISPENSED
Start: 2024-10-01